# Patient Record
Sex: MALE | Race: WHITE | NOT HISPANIC OR LATINO | Employment: OTHER | URBAN - METROPOLITAN AREA
[De-identification: names, ages, dates, MRNs, and addresses within clinical notes are randomized per-mention and may not be internally consistent; named-entity substitution may affect disease eponyms.]

---

## 2019-01-08 ENCOUNTER — OFFICE VISIT (OUTPATIENT)
Dept: OTOLARYNGOLOGY | Facility: CLINIC | Age: 54
End: 2019-01-08
Payer: COMMERCIAL

## 2019-01-08 ENCOUNTER — CLINICAL SUPPORT (OUTPATIENT)
Dept: AUDIOLOGY | Facility: CLINIC | Age: 54
End: 2019-01-08
Payer: COMMERCIAL

## 2019-01-08 VITALS
HEART RATE: 82 BPM | DIASTOLIC BLOOD PRESSURE: 73 MMHG | WEIGHT: 185.19 LBS | TEMPERATURE: 99 F | HEIGHT: 70 IN | BODY MASS INDEX: 26.51 KG/M2 | SYSTOLIC BLOOD PRESSURE: 115 MMHG

## 2019-01-08 DIAGNOSIS — H90.A22 SENSORINEURAL HEARING LOSS (SNHL) OF LEFT EAR WITH RESTRICTED HEARING OF RIGHT EAR: ICD-10-CM

## 2019-01-08 DIAGNOSIS — H90.A11 CONDUCTIVE HEARING LOSS OF RIGHT EAR WITH RESTRICTED HEARING OF LEFT EAR: ICD-10-CM

## 2019-01-08 DIAGNOSIS — H71.93 CHOLESTEATOMA, BILATERAL: Primary | ICD-10-CM

## 2019-01-08 DIAGNOSIS — Z90.89 STATUS POST MASTOIDECTOMY: ICD-10-CM

## 2019-01-08 DIAGNOSIS — H90.0 CONDUCTIVE HEARING LOSS OF BOTH EARS: Primary | ICD-10-CM

## 2019-01-08 PROCEDURE — 92567 TYMPANOMETRY: CPT | Mod: S$GLB,,, | Performed by: AUDIOLOGIST

## 2019-01-08 PROCEDURE — 99204 PR OFFICE/OUTPT VISIT, NEW, LEVL IV, 45-59 MIN: ICD-10-PCS | Mod: 25,S$GLB,, | Performed by: OTOLARYNGOLOGY

## 2019-01-08 PROCEDURE — 69220 CLEAN OUT MASTOID CAVITY: CPT | Mod: LT,S$GLB,, | Performed by: OTOLARYNGOLOGY

## 2019-01-08 PROCEDURE — 99204 OFFICE O/P NEW MOD 45 MIN: CPT | Mod: 25,S$GLB,, | Performed by: OTOLARYNGOLOGY

## 2019-01-08 PROCEDURE — 3008F BODY MASS INDEX DOCD: CPT | Mod: CPTII,S$GLB,, | Performed by: OTOLARYNGOLOGY

## 2019-01-08 PROCEDURE — 99999 PR PBB SHADOW E&M-NEW PATIENT-LVL I: CPT | Mod: PBBFAC,,,

## 2019-01-08 PROCEDURE — 92557 COMPREHENSIVE HEARING TEST: CPT | Mod: S$GLB,,, | Performed by: AUDIOLOGIST

## 2019-01-08 PROCEDURE — 99999 PR PBB SHADOW E&M-NEW PATIENT-LVL I: ICD-10-PCS | Mod: PBBFAC,,,

## 2019-01-08 PROCEDURE — 99999 PR PBB SHADOW E&M-EST. PATIENT-LVL III: CPT | Mod: PBBFAC,,, | Performed by: OTOLARYNGOLOGY

## 2019-01-08 PROCEDURE — 92567 PR TYMPA2METRY: ICD-10-PCS | Mod: S$GLB,,, | Performed by: AUDIOLOGIST

## 2019-01-08 PROCEDURE — 92557 PR COMPREHENSIVE HEARING TEST: ICD-10-PCS | Mod: S$GLB,,, | Performed by: AUDIOLOGIST

## 2019-01-08 PROCEDURE — 3008F PR BODY MASS INDEX (BMI) DOCUMENTED: ICD-10-PCS | Mod: CPTII,S$GLB,, | Performed by: OTOLARYNGOLOGY

## 2019-01-08 PROCEDURE — 69220 PR DEBRIDE, MASTOID CAVITY, SIMPLE: ICD-10-PCS | Mod: LT,S$GLB,, | Performed by: OTOLARYNGOLOGY

## 2019-01-08 PROCEDURE — 99999 PR PBB SHADOW E&M-EST. PATIENT-LVL III: ICD-10-PCS | Mod: PBBFAC,,, | Performed by: OTOLARYNGOLOGY

## 2019-01-08 RX ORDER — TRETINOIN 0.1 MG/G
GEL TOPICAL
Refills: 3 | COMMUNITY
Start: 2018-12-21

## 2019-01-08 RX ORDER — ELVITEGRAVIR, COBICISTAT, EMTRICITABINE, AND TENOFOVIR ALAFENAMIDE 150; 150; 200; 10 MG/1; MG/1; MG/1; MG/1
1 TABLET ORAL DAILY
Refills: 1 | COMMUNITY
Start: 2018-12-10

## 2019-01-08 RX ORDER — LISINOPRIL AND HYDROCHLOROTHIAZIDE 10; 12.5 MG/1; MG/1
TABLET ORAL
Refills: 6 | COMMUNITY
Start: 2018-12-08

## 2019-01-08 NOTE — PROGRESS NOTES
Mr. Addison was seen today for a hearing evaluation. Mr. Addison stated he recently had surgery due to a cholesteatoma in his left ear.     Pure tone audiometry revealed mild-moderate CHL, AD and a mild to moderate primarily SNHL (conductive component at 4000 Hz.), AS  SRT and PTA are in good agreement bilaterally.    SRT was obtained at 40dB for the right ear with 100% speech discrimination and 30dB for the left ear with 100% speech discrimination.   Tympanometry revealed Type B, AD. Could not evaluate, AS    Recommendations:  1. Otologic Evaluation  2. Audiogram as needed  3. Hearing Protection  4. Amplification consult pending medical management

## 2019-01-08 NOTE — PROGRESS NOTES
Subjective:       Patient ID: Juan C Addison is a 53 y.o. male.    Chief Complaint: Hearing Loss    HPI     Juan C Addison is a 53 y.o. male with history of cholesteatoma and CSOM.  Had ear infections all his life, but has not had drainage for last few years.  Has had CWD surgery on left ear years ago in [place].  Has not had an ear cleaning in 4 yrs.  Currently denies otorrhea, otalgia.  Reports moderate, constant hearing loss of both ears.        Review of Systems   Constitutional: Negative for chills, fever and unexpected weight change.   HENT: Negative for sore throat and trouble swallowing.    Eyes: Negative for pain and visual disturbance.   Respiratory: Negative for apnea and shortness of breath.    Cardiovascular: Negative for chest pain and palpitations.   Gastrointestinal: Negative for abdominal pain and nausea.   Endocrine: Negative for cold intolerance and heat intolerance.   Musculoskeletal: Negative for joint swelling and neck stiffness.   Skin: Negative for color change and rash.   Neurological: Negative for facial asymmetry and headaches.   Hematological: Negative for adenopathy. Does not bruise/bleed easily.   Psychiatric/Behavioral: Negative for agitation. The patient is not nervous/anxious.         Objective:      Physical Exam   Constitutional: He is oriented to person, place, and time. He appears well-developed and well-nourished. No distress.   HENT:   Head: Normocephalic and atraumatic.   Right Ear: External ear and ear canal normal. Tympanic membrane is retracted (retraction pocket superiorly with ceruminous debris, IS joint erosioin noted).   Left Ear: Ear canal normal. There is drainage (canal wall down cavity filled with debris, there is some moisture noted under debris after removal  TM intact, with myringostapediopexy, epitympanic retraction pocket).   Nose: Nose normal.   Mouth/Throat: Uvula is midline, oropharynx is clear and moist and mucous membranes are normal.   Patiño:  Right  Rinne:  BC > AC AD. AC > BC AS   Eyes: Conjunctivae and EOM are normal. Pupils are equal, round, and reactive to light.   Neck: Normal range of motion. No tracheal deviation present. No thyromegaly present.   Cardiovascular: Normal rate and regular rhythm.   Pulmonary/Chest: Effort normal. No respiratory distress.   Musculoskeletal: Normal range of motion.   Lymphadenopathy:        Head (right side): No submental, no submandibular and no tonsillar adenopathy present.        Head (left side): No submental, no submandibular and no tonsillar adenopathy present.     He has no cervical adenopathy.   Neurological: He is alert and oriented to person, place, and time.   Psychiatric: He has a normal mood and affect. His behavior is normal.   Nursing note and vitals reviewed.      Procedure:  The patient was brought to the minor procedure room and the left cavity was cleaned of squamous and cerumenous debris with micro dissection techniques using the operating microscope. No evidence of recurrent or residual cholesteatoma. Patient tolerated procedure well.    Audio:          Assessment:       1. Cholesteatoma, bilateral    2. Status post mastoidectomy    3. Conductive hearing loss of right ear with restricted hearing of left ear    4. Sensorineural hearing loss (SNHL) of left ear with restricted hearing of right ear        53 yr old with bilateral chronic ear disease s/p left CWD mastoidectomy.  Has had dry ears for years. Right side with evidence of attic retraction suspicious for cholesteatoma.  Will evaluate with CT temporal bone, if negative can observe pocket for now.    Plan:    CT temporal bone   if negative ok to follow up in 4 month for cavity cleaning of left side

## 2019-01-15 ENCOUNTER — HOSPITAL ENCOUNTER (OUTPATIENT)
Dept: RADIOLOGY | Facility: HOSPITAL | Age: 54
Discharge: HOME | End: 2019-01-15
Attending: OTOLARYNGOLOGY
Payer: COMMERCIAL

## 2019-01-15 DIAGNOSIS — H71.93 CHOLESTEATOMA, BILATERAL: ICD-10-CM

## 2019-01-15 PROCEDURE — 70480 CT ORBIT/EAR/FOSSA W/O DYE: CPT | Mod: TC

## 2019-01-15 PROCEDURE — 70480 CT TEMPORAL BONE WITHOUT CONTRAST: ICD-10-PCS | Mod: 26,,, | Performed by: RADIOLOGY

## 2019-01-15 PROCEDURE — 70480 CT ORBIT/EAR/FOSSA W/O DYE: CPT | Mod: 26,,, | Performed by: RADIOLOGY

## 2019-01-16 ENCOUNTER — TELEPHONE (OUTPATIENT)
Dept: OTOLARYNGOLOGY | Facility: CLINIC | Age: 54
End: 2019-01-16

## 2019-01-16 NOTE — TELEPHONE ENCOUNTER
Spoke with patient regarding CT findings.  Based on clinical examination and opacification of attic and mastoid antrum are concerned about cholesteatoma of right ear and recommend tympanoplasty with mastoidectomy.  Patient expressed understanding and will plan to call to schedule surgery.

## 2019-01-18 ENCOUNTER — TELEPHONE (OUTPATIENT)
Dept: OTOLARYNGOLOGY | Facility: CLINIC | Age: 54
End: 2019-01-18

## 2019-01-18 DIAGNOSIS — H90.A22 SENSORINEURAL HEARING LOSS (SNHL) OF LEFT EAR WITH RESTRICTED HEARING OF RIGHT EAR: ICD-10-CM

## 2019-01-18 DIAGNOSIS — Z90.89 STATUS POST MASTOIDECTOMY: ICD-10-CM

## 2019-01-18 DIAGNOSIS — H71.93 CHOLESTEATOMA, BILATERAL: Primary | ICD-10-CM

## 2019-01-22 ENCOUNTER — TELEPHONE (OUTPATIENT)
Dept: OTOLARYNGOLOGY | Facility: CLINIC | Age: 54
End: 2019-01-22

## 2019-02-08 ENCOUNTER — TELEPHONE (OUTPATIENT)
Dept: OTOLARYNGOLOGY | Facility: CLINIC | Age: 54
End: 2019-02-08

## 2019-02-08 NOTE — PRE-PROCEDURE INSTRUCTIONS
PreOp Instructions given   - Verbal medication information (what to hold and what to take)   - NPO guidelines   - Arrival place directions given;  - Bathing with antibacterial soap   - Don't wear any jewelry or bring any valuables AM of surgery   - No makeup or moisturizer to face   - No perfume/cologne, powder, lotions or aftershave   Pt. verbalized understanding.  Denies any  history of side effects or issues with anesthesia or sedation.

## 2019-02-10 ENCOUNTER — ANESTHESIA EVENT (OUTPATIENT)
Dept: SURGERY | Facility: HOSPITAL | Age: 54
End: 2019-02-10
Payer: COMMERCIAL

## 2019-02-10 NOTE — ANESTHESIA PREPROCEDURE EVALUATION
[facility]  Anesthesia Pre-Operative Evaluation         Patient Name: Juan C Addison  YOB: 1965  MRN: [id number]    SUBJECTIVE:     Pre-operative evaluation for Procedure(s) (LRB):  TYMPANOPLASTY, WITH MASTOIDECTOMY (Right)     02/10/2019    Juan C Addison is a 53 y.o. male w/ a significant PMHx of HTN and HIV. Hx of chronic otitis media and cholesteatoma. CT findings again suggestive of cholesteatoma in R ear.    Patient now presents for the above procedure(s).      LDA: None documented.    Prev airway: None documented.    Drips: None documented.    There is no problem list on file for this patient.      Review of patient's allergies indicates:  No Known Allergies    Current Outpatient Medications:  No current facility-administered medications for this encounter.     Current Outpatient Medications:     GENVOYA [phone] mg Tab, Take 1 tablet by mouth once daily., Disp: , Rfl: 1    lisinopril-hydrochlorothiazide (PRINZIDE,ZESTORETIC) 10-12.5 mg per tablet, TAKE 1 TABLET BY MOUTH ONCE DAILY FOR 30 DAYS, Disp: , Rfl: 6    tretinoin (RETIN-A) 0.01 % gel, APPLY THIN LAYER TO FACE AT BEDTIME, Disp: , Rfl: 3    No past surgical history on file.    Social History     Socioeconomic History    Marital status:      Spouse name: Not on file    Number of children: Not on file    Years of education: Not on file    Highest education level: Not on file   Social Needs    Financial resource strain: Not on file    Food insecurity - worry: Not on file    Food insecurity - inability: Not on file    Transportation needs - medical: Not on file    Transportation needs - non-medical: Not on file   Occupational History    Not on file   Tobacco Use    Smoking status: Never Smoker    Smokeless tobacco: Never Used   Substance and Sexual Activity    Alcohol use: Not on file    Drug use: Not on file    Sexual activity: Not on file   Other Topics Concern    Not on file   Social  History Narrative    Not on file       OBJECTIVE:     Vital Signs Range (Last 24H):         Significant Labs:  No results found for: WBC, HGB, HCT, PLT, CHOL, TRIG, HDL, LDLDIRECT, ALT, AST, NA, K, CL, CREATININE, BUN, CO2, TSH, PSA, INR, GLUF, HGBA1C, MICROALBUR    Diagnostic Studies: No relevant studies.    EKG: No recent studies available.    2D ECHO:  No results found for this or any previous visit.      ASSESSMENT/PLAN:         Anesthesia Evaluation    I have reviewed the Patient Summary Reports.     I have reviewed the Medications.     Review of Systems  Anesthesia Hx:  No problems with previous Anesthesia  History of prior surgery of interest to airway management or planning:  Denies Personal Hx of Anesthesia complications.   Social:  Non-Smoker    Hematology/Oncology:        Hematology Comments: HIV+   Cardiovascular:   Hypertension Denies CAD.       Pulmonary:   Denies COPD.  Denies Asthma.  Denies Recent URI.    Renal/:   Denies Chronic Renal Disease.     Hepatic/GI:   Denies GERD.    Neurological:   Denies TIA. Denies CVA.    Endocrine:   Denies Diabetes.        Physical Exam  General:  Well nourished    Airway/Jaw/Neck:  Airway Findings: Mouth Opening: Normal Tongue: Normal  General Airway Assessment: Adult  Mallampati: II  TM Distance: Normal, at least 6 cm  Jaw/Neck Findings:  Neck ROM: Normal ROM      Dental:  Dental Findings: In tact   Chest/Lungs:  Chest/Lungs Findings: Clear to auscultation, Normal Respiratory Rate         Mental Status:  Mental Status Findings:  Cooperative, Alert and Oriented         Anesthesia Plan  Type of Anesthesia, risks & benefits discussed:  Anesthesia Type:  general  Patient's Preference:   Intra-op Monitoring Plan: standard ASA monitors  Intra-op Monitoring Plan Comments:   Post Op Pain Control Plan: multimodal analgesia, IV/PO Opioids PRN and per primary service following discharge from PACU  Post Op Pain Control Plan Comments:   Induction:   IV  Beta Blocker:   Patient is not currently on a Beta-Blocker (No further documentation required).       Informed Consent: Patient understands risks and agrees with Anesthesia plan.  Questions answered. Anesthesia consent signed with patient.  ASA Score: 2     Day of Surgery Review of History & Physical:    H&P update referred to the surgeon.         Ready For Surgery From Anesthesia Perspective.

## 2019-02-11 ENCOUNTER — ANESTHESIA (OUTPATIENT)
Dept: SURGERY | Facility: HOSPITAL | Age: 54
End: 2019-02-11
Payer: COMMERCIAL

## 2019-02-11 ENCOUNTER — HOSPITAL ENCOUNTER (OUTPATIENT)
Facility: HOSPITAL | Age: 54
Discharge: HOME | End: 2019-02-11
Attending: OTOLARYNGOLOGY | Admitting: OTOLARYNGOLOGY
Payer: COMMERCIAL

## 2019-02-11 VITALS
BODY MASS INDEX: 26.05 KG/M2 | HEIGHT: 70 IN | OXYGEN SATURATION: 95 % | WEIGHT: 182 LBS | RESPIRATION RATE: 16 BRPM | SYSTOLIC BLOOD PRESSURE: 114 MMHG | HEART RATE: 68 BPM | TEMPERATURE: 98 F | DIASTOLIC BLOOD PRESSURE: 63 MMHG

## 2019-02-11 DIAGNOSIS — H71.91 CHOLESTEATOMA OF RIGHT EAR: Primary | ICD-10-CM

## 2019-02-11 DIAGNOSIS — H71.91 CHOLESTEATOMA, RIGHT: ICD-10-CM

## 2019-02-11 PROCEDURE — 25000003 PHARM REV CODE 250: Performed by: NURSE ANESTHETIST, CERTIFIED REGISTERED

## 2019-02-11 PROCEDURE — 71000016 HC POSTOP RECOV ADDL HR: Performed by: OTOLARYNGOLOGY

## 2019-02-11 PROCEDURE — 25000003 PHARM REV CODE 250: Performed by: OTOLARYNGOLOGY

## 2019-02-11 PROCEDURE — 88305 TISSUE EXAM BY PATHOLOGIST: CPT | Mod: 26,,, | Performed by: PATHOLOGY

## 2019-02-11 PROCEDURE — 63600175 PHARM REV CODE 636 W HCPCS: Performed by: STUDENT IN AN ORGANIZED HEALTH CARE EDUCATION/TRAINING PROGRAM

## 2019-02-11 PROCEDURE — 88305 TISSUE EXAM BY PATHOLOGIST: CPT | Performed by: PATHOLOGY

## 2019-02-11 PROCEDURE — 94761 N-INVAS EAR/PLS OXIMETRY MLT: CPT

## 2019-02-11 PROCEDURE — 71000015 HC POSTOP RECOV 1ST HR: Performed by: OTOLARYNGOLOGY

## 2019-02-11 PROCEDURE — 36000709 HC OR TIME LEV III EA ADD 15 MIN: Performed by: OTOLARYNGOLOGY

## 2019-02-11 PROCEDURE — D9220A PRA ANESTHESIA: Mod: ,,, | Performed by: ANESTHESIOLOGY

## 2019-02-11 PROCEDURE — 37000009 HC ANESTHESIA EA ADD 15 MINS: Performed by: OTOLARYNGOLOGY

## 2019-02-11 PROCEDURE — 71000033 HC RECOVERY, INTIAL HOUR: Performed by: OTOLARYNGOLOGY

## 2019-02-11 PROCEDURE — 63600175 PHARM REV CODE 636 W HCPCS: Performed by: NURSE ANESTHETIST, CERTIFIED REGISTERED

## 2019-02-11 PROCEDURE — 27201423 OPTIME MED/SURG SUP & DEVICES STERILE SUPPLY: Performed by: OTOLARYNGOLOGY

## 2019-02-11 PROCEDURE — 63600175 PHARM REV CODE 636 W HCPCS: Performed by: ANESTHESIOLOGY

## 2019-02-11 PROCEDURE — 36000708 HC OR TIME LEV III 1ST 15 MIN: Performed by: OTOLARYNGOLOGY

## 2019-02-11 PROCEDURE — 88305 TISSUE SPECIMEN TO PATHOLOGY - SURGERY: ICD-10-PCS | Mod: 26,,, | Performed by: PATHOLOGY

## 2019-02-11 PROCEDURE — 69645 REVISE MIDDLE EAR & MASTOID: CPT | Mod: RT,,, | Performed by: OTOLARYNGOLOGY

## 2019-02-11 PROCEDURE — 69645 PR TYMPANOPLAS/MASTOIDEC,RADICAL/COMPLE: ICD-10-PCS | Mod: RT,,, | Performed by: OTOLARYNGOLOGY

## 2019-02-11 PROCEDURE — 25000003 PHARM REV CODE 250: Performed by: STUDENT IN AN ORGANIZED HEALTH CARE EDUCATION/TRAINING PROGRAM

## 2019-02-11 PROCEDURE — 27000221 HC OXYGEN, UP TO 24 HOURS

## 2019-02-11 PROCEDURE — 37000008 HC ANESTHESIA 1ST 15 MINUTES: Performed by: OTOLARYNGOLOGY

## 2019-02-11 PROCEDURE — D9220A PRA ANESTHESIA: ICD-10-PCS | Mod: ,,, | Performed by: ANESTHESIOLOGY

## 2019-02-11 RX ORDER — CEFAZOLIN SODIUM 1 G/3ML
2 INJECTION, POWDER, FOR SOLUTION INTRAMUSCULAR; INTRAVENOUS
Status: DISCONTINUED | OUTPATIENT
Start: 2019-02-11 | End: 2019-02-11 | Stop reason: HOSPADM

## 2019-02-11 RX ORDER — PROPOFOL 10 MG/ML
VIAL (ML) INTRAVENOUS
Status: DISCONTINUED | OUTPATIENT
Start: 2019-02-11 | End: 2019-02-11

## 2019-02-11 RX ORDER — KETAMINE HCL IN 0.9 % NACL 50 MG/5 ML
SYRINGE (ML) INTRAVENOUS
Status: DISCONTINUED | OUTPATIENT
Start: 2019-02-11 | End: 2019-02-11

## 2019-02-11 RX ORDER — HYDROMORPHONE HYDROCHLORIDE 1 MG/ML
0.2 INJECTION, SOLUTION INTRAMUSCULAR; INTRAVENOUS; SUBCUTANEOUS EVERY 5 MIN PRN
Status: DISCONTINUED | OUTPATIENT
Start: 2019-02-11 | End: 2019-02-11 | Stop reason: HOSPADM

## 2019-02-11 RX ORDER — DEXAMETHASONE SODIUM PHOSPHATE 4 MG/ML
INJECTION, SOLUTION INTRA-ARTICULAR; INTRALESIONAL; INTRAMUSCULAR; INTRAVENOUS; SOFT TISSUE
Status: DISCONTINUED | OUTPATIENT
Start: 2019-02-11 | End: 2019-02-11

## 2019-02-11 RX ORDER — CEPHALEXIN 500 MG/1
500 CAPSULE ORAL EVERY 12 HOURS
Qty: 14 CAPSULE | Refills: 0 | Status: SHIPPED | OUTPATIENT
Start: 2019-02-11 | End: 2019-02-18

## 2019-02-11 RX ORDER — ACETAMINOPHEN 10 MG/ML
INJECTION, SOLUTION INTRAVENOUS
Status: DISCONTINUED | OUTPATIENT
Start: 2019-02-11 | End: 2019-02-11

## 2019-02-11 RX ORDER — OXYCODONE AND ACETAMINOPHEN 5; 325 MG/1; MG/1
1 TABLET ORAL EVERY 4 HOURS PRN
Qty: 20 TABLET | Refills: 0 | Status: SHIPPED | OUTPATIENT
Start: 2019-02-11 | End: 2019-02-18

## 2019-02-11 RX ORDER — FENTANYL CITRATE 50 UG/ML
INJECTION, SOLUTION INTRAMUSCULAR; INTRAVENOUS
Status: DISCONTINUED | OUTPATIENT
Start: 2019-02-11 | End: 2019-02-11

## 2019-02-11 RX ORDER — NEOSTIGMINE METHYLSULFATE 1 MG/ML
INJECTION, SOLUTION INTRAVENOUS
Status: DISCONTINUED | OUTPATIENT
Start: 2019-02-11 | End: 2019-02-11

## 2019-02-11 RX ORDER — LIDOCAINE HYDROCHLORIDE 10 MG/ML
1 INJECTION, SOLUTION EPIDURAL; INFILTRATION; INTRACAUDAL; PERINEURAL ONCE
Status: DISCONTINUED | OUTPATIENT
Start: 2019-02-11 | End: 2019-02-11 | Stop reason: HOSPADM

## 2019-02-11 RX ORDER — GLYCOPYRROLATE 0.2 MG/ML
INJECTION INTRAMUSCULAR; INTRAVENOUS
Status: DISCONTINUED | OUTPATIENT
Start: 2019-02-11 | End: 2019-02-11

## 2019-02-11 RX ORDER — SODIUM CHLORIDE 9 MG/ML
INJECTION, SOLUTION INTRAVENOUS CONTINUOUS
Status: DISCONTINUED | OUTPATIENT
Start: 2019-02-11 | End: 2019-02-11 | Stop reason: HOSPADM

## 2019-02-11 RX ORDER — FENTANYL CITRATE 50 UG/ML
25 INJECTION, SOLUTION INTRAMUSCULAR; INTRAVENOUS EVERY 5 MIN PRN
Status: DISCONTINUED | OUTPATIENT
Start: 2019-02-11 | End: 2019-02-11 | Stop reason: HOSPADM

## 2019-02-11 RX ORDER — LIDOCAINE HYDROCHLORIDE AND EPINEPHRINE 10; 10 MG/ML; UG/ML
INJECTION, SOLUTION INFILTRATION; PERINEURAL
Status: DISCONTINUED | OUTPATIENT
Start: 2019-02-11 | End: 2019-02-11 | Stop reason: HOSPADM

## 2019-02-11 RX ORDER — ONDANSETRON 2 MG/ML
4 INJECTION INTRAMUSCULAR; INTRAVENOUS DAILY PRN
Status: DISCONTINUED | OUTPATIENT
Start: 2019-02-11 | End: 2019-02-11 | Stop reason: HOSPADM

## 2019-02-11 RX ORDER — ONDANSETRON 2 MG/ML
INJECTION INTRAMUSCULAR; INTRAVENOUS
Status: DISCONTINUED | OUTPATIENT
Start: 2019-02-11 | End: 2019-02-11

## 2019-02-11 RX ORDER — SODIUM CHLORIDE 0.9 % (FLUSH) 0.9 %
3 SYRINGE (ML) INJECTION
Status: DISCONTINUED | OUTPATIENT
Start: 2019-02-11 | End: 2019-02-11 | Stop reason: HOSPADM

## 2019-02-11 RX ORDER — ROCURONIUM BROMIDE 10 MG/ML
INJECTION, SOLUTION INTRAVENOUS
Status: DISCONTINUED | OUTPATIENT
Start: 2019-02-11 | End: 2019-02-11

## 2019-02-11 RX ORDER — LIDOCAINE HCL/PF 100 MG/5ML
SYRINGE (ML) INTRAVENOUS
Status: DISCONTINUED | OUTPATIENT
Start: 2019-02-11 | End: 2019-02-11

## 2019-02-11 RX ORDER — MIDAZOLAM HYDROCHLORIDE 1 MG/ML
INJECTION, SOLUTION INTRAMUSCULAR; INTRAVENOUS
Status: DISCONTINUED | OUTPATIENT
Start: 2019-02-11 | End: 2019-02-11

## 2019-02-11 RX ORDER — ONDANSETRON 8 MG/1
8 TABLET, ORALLY DISINTEGRATING ORAL EVERY 6 HOURS PRN
Qty: 20 TABLET | Refills: 0 | Status: SHIPPED | OUTPATIENT
Start: 2019-02-11 | End: 2019-02-18

## 2019-02-11 RX ADMIN — SODIUM CHLORIDE, SODIUM GLUCONATE, SODIUM ACETATE, POTASSIUM CHLORIDE, MAGNESIUM CHLORIDE, SODIUM PHOSPHATE, DIBASIC, AND POTASSIUM PHOSPHATE: .53; .5; .37; .037; .03; .012; .00082 INJECTION, SOLUTION INTRAVENOUS at 01:02

## 2019-02-11 RX ADMIN — MIDAZOLAM HYDROCHLORIDE 2 MG: 1 INJECTION, SOLUTION INTRAMUSCULAR; INTRAVENOUS at 12:02

## 2019-02-11 RX ADMIN — PROPOFOL 160 MG: 10 INJECTION, EMULSION INTRAVENOUS at 12:02

## 2019-02-11 RX ADMIN — Medication 20 MG: at 12:02

## 2019-02-11 RX ADMIN — HYDROMORPHONE HYDROCHLORIDE 0.2 MG: 1 INJECTION, SOLUTION INTRAMUSCULAR; INTRAVENOUS; SUBCUTANEOUS at 05:02

## 2019-02-11 RX ADMIN — FENTANYL CITRATE 50 MCG: 50 INJECTION, SOLUTION INTRAMUSCULAR; INTRAVENOUS at 05:02

## 2019-02-11 RX ADMIN — HYDROMORPHONE HYDROCHLORIDE 0.2 MG: 1 INJECTION, SOLUTION INTRAMUSCULAR; INTRAVENOUS; SUBCUTANEOUS at 06:02

## 2019-02-11 RX ADMIN — ROCURONIUM BROMIDE 20 MG: 10 INJECTION, SOLUTION INTRAVENOUS at 01:02

## 2019-02-11 RX ADMIN — DEXAMETHASONE SODIUM PHOSPHATE 4 MG: 4 INJECTION, SOLUTION INTRAMUSCULAR; INTRAVENOUS at 01:02

## 2019-02-11 RX ADMIN — SODIUM CHLORIDE 1000 ML: 0.9 INJECTION, SOLUTION INTRAVENOUS at 11:02

## 2019-02-11 RX ADMIN — ACETAMINOPHEN 1000 MG: 10 INJECTION, SOLUTION INTRAVENOUS at 01:02

## 2019-02-11 RX ADMIN — NEOSTIGMINE METHYLSULFATE 3 MG: 1 INJECTION INTRAVENOUS at 05:02

## 2019-02-11 RX ADMIN — FENTANYL CITRATE 50 MCG: 50 INJECTION, SOLUTION INTRAMUSCULAR; INTRAVENOUS at 01:02

## 2019-02-11 RX ADMIN — FENTANYL CITRATE 50 MCG: 50 INJECTION, SOLUTION INTRAMUSCULAR; INTRAVENOUS at 12:02

## 2019-02-11 RX ADMIN — FENTANYL CITRATE 50 MCG: 50 INJECTION, SOLUTION INTRAMUSCULAR; INTRAVENOUS at 04:02

## 2019-02-11 RX ADMIN — ROCURONIUM BROMIDE 50 MG: 10 INJECTION, SOLUTION INTRAVENOUS at 12:02

## 2019-02-11 RX ADMIN — ONDANSETRON 4 MG: 2 INJECTION INTRAMUSCULAR; INTRAVENOUS at 05:02

## 2019-02-11 RX ADMIN — Medication 20 MG: at 01:02

## 2019-02-11 RX ADMIN — ONDANSETRON 4 MG: 2 INJECTION INTRAMUSCULAR; INTRAVENOUS at 07:02

## 2019-02-11 RX ADMIN — LIDOCAINE HYDROCHLORIDE 100 MG: 20 INJECTION, SOLUTION INTRAVENOUS at 12:02

## 2019-02-11 RX ADMIN — CEFAZOLIN 2 G: 330 INJECTION, POWDER, FOR SOLUTION INTRAMUSCULAR; INTRAVENOUS at 01:02

## 2019-02-11 RX ADMIN — Medication 10 MG: at 02:02

## 2019-02-11 RX ADMIN — GLYCOPYRROLATE 0.3 MG: 0.2 INJECTION, SOLUTION INTRAMUSCULAR; INTRAVENOUS at 05:02

## 2019-02-11 NOTE — DISCHARGE SUMMARY
[facility]  Brief Operative Note     SUMMARY     Surgery Date: 2/11/2019     Surgeon(s) and Role:     * Slim Capellan MD - Primary     * Ki Radford MD - Resident - Assisting      Pre-op Diagnosis:  Cholesteatoma, bilateral [H71.93]  Status post mastoidectomy [Z90.89]  Sensorineural hearing loss (SNHL) of left ear with restricted hearing of right ear [H90.A22]    Post-op Diagnosis:  Post-Op Diagnosis Codes:     * Cholesteatoma, bilateral [H71.93]     * Status post mastoidectomy [Z90.89]     * Sensorineural hearing loss (SNHL) of left ear with restricted hearing of right ear [H90.A22]    Procedure(s) (LRB):  TYMPANOPLASTY, WITH MASTOIDECTOMY (Right)    Anesthesia: General    Description of the findings of the procedure: See op note for details.  Canal wall down mastoidectomy for cholesteatoma.    Estimated Blood Loss: * No values recorded between 2/11/2019  1:20 PM and 2/11/2019  5:33 PM *         Specimens:   Specimen (12h ago, onward)    Start     Ordered    02/11/19 1503  Specimen to Pathology - Surgery  Once     Comments:  1.Right mastoid- permanent     Start Status   02/11/19 1503 Collected (02/11/19 1505)       02/11/19 1504          Discharge Note    SUMMARY     Admit Date: 2/11/2019    Discharge Date and Time:  02/11/2019 5:44 PM    Hospital Course (synopsis of major diagnoses, care, treatment, and services provided during the course of the hospital stay): Tolerated surgery well without complication and stable for discharge post-operatively.     Final Diagnosis: Post-Op Diagnosis Codes:     * Cholesteatoma, bilateral [H71.93]     * Status post mastoidectomy [Z90.89]     * Sensorineural hearing loss (SNHL) of left ear with restricted hearing of right ear [H90.A22]    Disposition: Home or Self Care    Follow Up/Patient Instructions:     Medications:  Reconciled Home Medications:      Medication List      ASK your doctor about these medications    GENVOYA [phone] mg Tab  Generic  drug:  elviteg-cob-emtri-tenof ALAFEN  Take 1 tablet by mouth once daily.     lisinopril-hydrochlorothiazide 10-12.5 mg per tablet  Commonly known as:  PRINZIDE,ZESTORETIC  TAKE 1 TABLET BY MOUTH ONCE DAILY FOR 30 DAYS     tretinoin 0.01 % gel  Commonly known as:  RETIN-A  APPLY THIN LAYER TO FACE AT BEDTIME          No discharge procedures on file.

## 2019-02-11 NOTE — H&P
Subjective:       Patient ID: Juan C Addison is a 53 y.o. male.     Chief Complaint: Hearing Loss     HPI      Juan C Addison is a 53 y.o. male with history of cholesteatoma and CSOM.  Had ear infections all his life, but has not had drainage for last few years.  Has had CWD surgery on left ear years ago in [place].  Has not had an ear cleaning in 4 yrs.  Currently denies otorrhea, otalgia.  Reports moderate, constant hearing loss of both ears.          Review of Systems   Constitutional: Negative for chills, fever and unexpected weight change.   HENT: Negative for sore throat and trouble swallowing.    Eyes: Negative for pain and visual disturbance.   Respiratory: Negative for apnea and shortness of breath.    Cardiovascular: Negative for chest pain and palpitations.   Gastrointestinal: Negative for abdominal pain and nausea.   Endocrine: Negative for cold intolerance and heat intolerance.   Musculoskeletal: Negative for joint swelling and neck stiffness.   Skin: Negative for color change and rash.   Neurological: Negative for facial asymmetry and headaches.   Hematological: Negative for adenopathy. Does not bruise/bleed easily.   Psychiatric/Behavioral: Negative for agitation. The patient is not nervous/anxious.         Objective:   Physical Exam   Constitutional: He is oriented to person, place, and time. He appears well-developed and well-nourished. No distress.   HENT:   Head: Normocephalic and atraumatic.   Right Ear: External ear and ear canal normal. Tympanic membrane is retracted (retraction pocket superiorly with ceruminous debris, IS joint erosioin noted).   Left Ear: Ear canal normal. There is drainage (canal wall down cavity filled with debris, there is some moisture noted under debris after removal  TM intact, with myringostapediopexy, epitympanic retraction pocket).   Nose: Nose normal.   Mouth/Throat: Uvula is midline, oropharynx is clear and moist and mucous membranes are normal.   Patiño:  Right  Rinne:  BC > AC AD. AC > BC AS   Eyes: Conjunctivae and EOM are normal. Pupils are equal, round, and reactive to light.   Neck: Normal range of motion. No tracheal deviation present. No thyromegaly present.   Cardiovascular: Normal rate and regular rhythm.   Pulmonary/Chest: Effort normal. No respiratory distress.   Musculoskeletal: Normal range of motion.   Lymphadenopathy:        Head (right side): No submental, no submandibular and no tonsillar adenopathy present.        Head (left side): No submental, no submandibular and no tonsillar adenopathy present.     He has no cervical adenopathy.   Neurological: He is alert and oriented to person, place, and time.   Psychiatric: He has a normal mood and affect. His behavior is normal.   Nursing note and vitals reviewed.      Procedure:  The patient was brought to the minor procedure room and the left cavity was cleaned of squamous and cerumenous debris with micro dissection techniques using the operating microscope. No evidence of recurrent or residual cholesteatoma. Patient tolerated procedure well.     Audio:             Assessment:       1. Cholesteatoma, bilateral    2. Status post mastoidectomy    3. Conductive hearing loss of right ear with restricted hearing of left ear    4. Sensorineural hearing loss (SNHL) of left ear with restricted hearing of right ear        53 yr old with bilateral chronic ear disease s/p left CWD mastoidectomy.  Has had dry ears for years. Right side with evidence of attic retraction suspicious for cholesteatoma.  Will evaluate with CT temporal bone, if negative can observe pocket for now.    Plan:     To surgery for typanoplasty/mastoidectomy of the right ear.    H&P completed on 1/8/19 has been reviewed, the patient has been examined and:  I concur with the findings and no changes have occurred since H&P was written.    There are no hospital problems to display for this patient.

## 2019-02-11 NOTE — TRANSFER OF CARE
"Anesthesia Transfer of Care Note    Patient: Juan C Addison    Procedure(s) Performed: Procedure(s) (LRB):  TYMPANOPLASTY, WITH MASTOIDECTOMY (Right)    Patient location: PACU    Anesthesia Type: general    Transport from OR: Transported from OR on room air with adequate spontaneous ventilation    Post pain: adequate analgesia    Post assessment: no apparent anesthetic complications and tolerated procedure well    Post vital signs: stable    Level of consciousness: awake    Nausea/Vomiting: no nausea/vomiting    Complications: none    Transfer of care protocol was followed      Last vitals:   Visit Vitals  /81 (BP Location: Right arm, Patient Position: Lying)   Pulse 65   Temp 36.7 °C (98 °F) (Oral)   Resp 20   Ht 5' 10" (1.778 m)   Wt 82.6 kg (182 lb)   SpO2 99%   BMI 26.11 kg/m²     "

## 2019-02-12 ENCOUNTER — TELEPHONE (OUTPATIENT)
Dept: OTOLARYNGOLOGY | Facility: CLINIC | Age: 54
End: 2019-02-12

## 2019-02-12 NOTE — OP NOTE
[facility]  Surgery Department  Operative Note    SUMMARY     Date of Procedure: 2/11/2019     Procedure:   Tympanoplasty with modified radical mastoidectomy (canal wall down mastoidectomy)    Surgeon(s) and Role:     * Slim Capellan MD - Primary     * Ki Radford MD - Resident - Assisting        Pre-Operative Diagnosis: Cholesteatoma, bilateral [H71.93]  Status post mastoidectomy [Z90.89]  Sensorineural hearing loss (SNHL) of left ear with restricted hearing of right ear [H90.A22]    Post-Operative Diagnosis: Post-Op Diagnosis Codes:     * Cholesteatoma, bilateral [H71.93]     * Status post mastoidectomy [Z90.89]     * Sensorineural hearing loss (SNHL) of left ear with restricted hearing of right ear [H90.A22]    Anesthesia: General    Technical Procedures Used: CWD technique    Description of the Findings of the Procedure:  Epitympanic cholesteatoma with erosion of malleus head and incus body, extension to mastoid antrum, involvement of facial recess and posterior sinus    Significant Surgical Tasks Conducted by the Assistant(s), if Applicable: n/a    Complications: No    Estimated Blood Loss (EBL): 100ml     Procedure in detail:      The patient was taken to the operating room, placed under general anesthetic and intubated without difficulty. The NIM facial nerve monitoring electrodes were positioned and monitoring was performed throughout the procedure. There was no abnormal activity during this case. We inspected the ear canal, identified the defect of the parsflaccida, which was full of squamous debris.   We then infiltrated the canal and postauricular area with 1:100,000 of epinephrine.  Vascular strip incisions were made in the standard fashion for underlay tympanoplasty making our 12-6 incision a few millimeters from the defect.    We prepped and draped the ear in a sterile fashion. A postauricular incision was made just behind the postauricular crease and dissected down the mastoid  cortex.  A temporalis fascia graft was harvested using scissors.  I reflected the soft tissues anteriorly to the level of the ear canal and identified our prior incisions made in the ear canal. A tympanomeatal flap was raised and the middle ear was entered sharply.  Cholesteatoma was identified and it was located just in the epitympanum, and posterior to the stapes.  At the point we proceeded with mastoidectomy to identify the posterior limit of disease in the mastoid.      Once the borders of the disease in the middle ear had been identified we proceeded with mastoidectomy.  A #5 cutting bur was used to drill down the mastoid cortex.  We went all the way to the mastoid antrum. We finished a complete mastoidectomy with identification of the tegmen, sigmoid sinus.  The canal wall was left intact. F  Cholesteatoma was located in the mastoid involving the entire antrum and adherent to the lateral canal.  Epitympanectomy was performed and cholesteatoma was filling the posterior and anterior epitympanum.  Due to the extensive nature of the disease and limited access, it was decided to take the canal wall down.  The canal wall was removed using a cutting socorro, and the facial ridge was lowered to the level of the facial nerve using a 4 imra.        Cholesteatoma was then dissected from the middle ear, using microinstrumenation.  The ossicles were identified the IS joint was .  The incus and malleus head were remove due to involvement with cholesteatoma.  The disease was tracking into the posterior sinus, posterior to the stapes.  It was cleared from this area and from the epitympanum.  The remaining matrix was removed over the lateral semi-circular canal, there did not appear to be a fistula .  After complete removal of the cholesteatoma, the fascia graft was placed in an underlay fashion medial to the malleus and the remaining native TM.  The graft was layed onto the stapes captiulum.      Gelfoam was used to  secure the temporalis fascial graft and the entire cavity was filled with gelfoam soaked in ofloxacin solution.      A meatoplasty was performed next, by making 12 and 6 o' clock incisions in the ear canal extending laterally onto the conchal bowl.  The cartilage and soft tissue was thinned and the skin flap sutured posteriorly creating a much wider meatus.  The remainder of the ear canal skin was rotated into the cavity to facilitate epithelialization and the meatus was packed with gelfoam.    Newaygo dressing was applied. The patient was awakened from anesthesia and taken to the recovery room in stable condition. He will be given antibiotics and pain medicines and he will be given instructions to follow up with me in one week.              Implants: * No implants in log *    Specimens:   Specimen (12h ago, onward)    None                  Condition: Good    Disposition: PACU - hemodynamically stable.    Attestation: I was present and scrubbed for the entire procedure.

## 2019-02-12 NOTE — PROGRESS NOTES
Upon pt getting into wheelchair for discharge, latonia dressing appeared more saturated then upon arrival and pt movement out of bed. ENT on call paged. Drainage marking occurred. Awaiting return call.

## 2019-02-12 NOTE — ANESTHESIA POSTPROCEDURE EVALUATION
"Anesthesia Post Evaluation    Patient: Juan C Addison    Procedure(s) Performed: Procedure(s) (LRB):  TYMPANOPLASTY, WITH MASTOIDECTOMY (Right)    Final Anesthesia Type: general  Patient location during evaluation: PACU  Patient participation: Yes- Able to Participate  Level of consciousness: awake and alert and oriented  Post-procedure vital signs: reviewed and stable  Pain management: adequate  Airway patency: patent  PONV status at discharge: No PONV  Anesthetic complications: no      Cardiovascular status: blood pressure returned to baseline  Respiratory status: unassisted  Hydration status: euvolemic  Follow-up not needed.        Visit Vitals  /60   Pulse 74   Temp 36.5 °C (97.7 °F) (Temporal)   Resp 19   Ht 5' 10" (1.778 m)   Wt 82.6 kg (182 lb)   SpO2 96%   BMI 26.11 kg/m²       Pain/Chelsey Score: Pain Rating Prior to Med Admin: 3 (2/11/2019  6:00 PM)        "

## 2019-02-12 NOTE — PLAN OF CARE
Discharge instructions reviewed with pt and partner. Understanding verbalized. No complaints of pain reported. Paper prescriptions given. To be transported to car by PCT.

## 2019-02-12 NOTE — DISCHARGE INSTRUCTIONS
Tympanoplasty or Mastoidectomy Post-operative Instructions    Precautions  1.  Do not blow your nose until your physician has indicated that your ear is healed.  Any accumulated secretions in the nose may be drawn back into the throat and expectorated if desired.  This particularly important if you develop a cold.   2. Do not pop your ears by holding your nose and blowing air through the eustachian tube into the ear.  If it is necessary to sneeze, do so with your mouth open.  3. Do not allow water to enter the ear until advised by your physician that he ear is healed.  Until such time, when showering or washing your hair, cotton may be placed in the outer ear opening and covered in Vaseline.  If an incision was made in the skin behind your ear, water should be kept away from this area for 1 week.  4. Do not take an unnecessary chance of catching a cold.  Avoid undue exposure or fatigue.  Should you catch a cold, treat it in your usual way, reporting to your physician if you develop ear symptoms  5. You may anticipate a certain amount of pulsation, popping, clicking, and other sounds in the ear, and a feeling of fullness in the ear.  Occasional sharp shooting pains are not unusual.  Sometimes it may feel as if there is liquid in the ear.  6. Do not plan to drive a car home from the hospital.  Air travel is ok 2 days after surgery.  When changing altitude, you should remain awake and chew gum to stimulate swallowing.  Dizziness  Minor dizziness may be present on head motion and not concern you unless it increases  Hearing  Hearing may be worse temporarily after surgery due to swelling and packing in the ear canal.  An improvement may be noted after 6-8 weeks, while maximum improvement may take up to 4-6 months.  Discharge  A bloody or Watery discharge may occur during the healing period.  The outer ear cotton may be changed if necessary   A purulent discharge at any time is an indication to call to make an  appointment  Pain  Mild, intermittent ear pain is not unusual during the first 2 weeks.  Pain above or in front of the ear is common when chewing.  If you have persistent unrelenting pain please let your physician know  Ear Drops  If you were given ear drops please start 1 week after surgery.  Place a few drops in the ear twice a day to loosen the packing which will run out of the ear as a liquid.  Tip the head to the side, place two drops in the ear, and allow them to remain for 5 minutes.  The tip the head to the opposite direction to allow the drops to run out.  Continue using until advised otherwise by your physician.                PATIENT INSTRUCTIONS  POST-ANESTHESIA    IMMEDIATELY FOLLOWING SURGERY:  Do not drive or operate machinery for the first twenty four hours after surgery.  Do not make any important decisions for twenty four hours after surgery or while taking narcotic pain medications or sedatives.  If you develop intractable nausea and vomiting or a severe headache please notify your doctor immediately.    FOLLOW-UP:  Please make an appointment with your surgeon as instructed. You do not need to follow up with anesthesia unless specifically instructed to do so.    WOUND CARE INSTRUCTIONS (if applicable):  Keep a dry clean dressing on the anesthesia/puncture wound site if there is drainage.  Once the wound has quit draining you may leave it open to air.  Generally you should leave the bandage intact for twenty four hours unless there is drainage.  If the epidural site drains for more than 36-48 hours please call the anesthesia department.    QUESTIONS?:  Please feel free to call your physician or the hospital  if you have any questions, and they will be happy to assist you.         Recovery After Procedural Sedation (Adult)  You have been given medicine by vein to make you sleep during your surgery. This may have included both a pain medicine and sleeping medicine. Most of the effects have  worn off. But you may still have some drowsiness for the next 6 to 8 hours.  Home care  Follow these guidelines when you get home:  · For the next 8 hours, you should be watched by a responsible adult. This person should make sure your condition is not getting worse.  · Don't drink any alcohol for the next 24 hours.  · Don't drive, operate dangerous machinery, or make important business or personal decisions during the next 24 hours.  Note: Your healthcare provider may tell you not to take any medicine by mouth for pain or sleep in the next 4 hours. These medicines may react with the medicines you were given in the hospital. This could cause a much stronger response than usual.  Follow-up care  Follow up with your healthcare provider if you are not alert and back to your usual level of activity within 12 hours.  When to seek medical advice  Call your healthcare provider right away if any of these occur:  · Drowsiness gets worse  · Weakness or dizziness gets worse  · Repeated vomiting  · You can't be awakened   Date Last Reviewed: 10/18/2016  © 0949-3479 [employer]. [address] [place] [address], [place] [address]. All rights reserved. This information is not intended as a substitute for professional medical care. Always follow your healthcare professional's instructions.

## 2019-02-12 NOTE — TELEPHONE ENCOUNTER
----- Message from Diane Smith sent at 2/12/2019  3:22 PM CST -----  Contact: pt  Pt would like to be called back regarding ear bleeding  Pt can be reached at [phone]

## 2019-02-12 NOTE — PROGRESS NOTES
MD Russo to bedside to address concerns of bleeding. Readjustment to latonia occurred, requested that pt be sent home with gauze incase of drainage. Pt re educated on calling clinic and reporting to ER if bleeding persists. Understanding verbalized.

## 2019-02-19 ENCOUNTER — OFFICE VISIT (OUTPATIENT)
Dept: OTOLARYNGOLOGY | Facility: CLINIC | Age: 54
End: 2019-02-19
Payer: COMMERCIAL

## 2019-02-19 VITALS — BODY MASS INDEX: 26.7 KG/M2 | HEIGHT: 70 IN | WEIGHT: 186.5 LBS

## 2019-02-19 DIAGNOSIS — H71.93 CHOLESTEATOMA, BILATERAL: Primary | ICD-10-CM

## 2019-02-19 PROCEDURE — 99024 POSTOP FOLLOW-UP VISIT: CPT | Mod: S$GLB,,, | Performed by: OTOLARYNGOLOGY

## 2019-02-19 PROCEDURE — 99999 PR PBB SHADOW E&M-EST. PATIENT-LVL II: ICD-10-PCS | Mod: PBBFAC,,, | Performed by: OTOLARYNGOLOGY

## 2019-02-19 PROCEDURE — 99999 PR PBB SHADOW E&M-EST. PATIENT-LVL II: CPT | Mod: PBBFAC,,, | Performed by: OTOLARYNGOLOGY

## 2019-02-19 PROCEDURE — 99024 PR POST-OP FOLLOW-UP VISIT: ICD-10-PCS | Mod: S$GLB,,, | Performed by: OTOLARYNGOLOGY

## 2019-02-19 RX ORDER — CIPROFLOXACIN AND DEXAMETHASONE 3; 1 MG/ML; MG/ML
4 SUSPENSION/ DROPS AURICULAR (OTIC) 2 TIMES DAILY
Qty: 7.5 ML | Refills: 3 | Status: SHIPPED | OUTPATIENT
Start: 2019-02-19 | End: 2019-03-01

## 2019-02-19 NOTE — PROGRESS NOTES
Steri strips removed. No evidence of hematoma or seroma. No evidence of infection. Packing is dry and intact. Start ototopical drops and re check in three weeks.

## 2019-03-26 ENCOUNTER — OFFICE VISIT (OUTPATIENT)
Dept: OTOLARYNGOLOGY | Facility: CLINIC | Age: 54
End: 2019-03-26
Payer: COMMERCIAL

## 2019-03-26 ENCOUNTER — TELEPHONE (OUTPATIENT)
Dept: OTOLARYNGOLOGY | Facility: CLINIC | Age: 54
End: 2019-03-26

## 2019-03-26 VITALS — HEIGHT: 70 IN | BODY MASS INDEX: 26.7 KG/M2 | WEIGHT: 186.5 LBS

## 2019-03-26 DIAGNOSIS — H71.93 CHOLESTEATOMA, BILATERAL: Primary | ICD-10-CM

## 2019-03-26 PROCEDURE — 99999 PR PBB SHADOW E&M-EST. PATIENT-LVL II: CPT | Mod: PBBFAC,,, | Performed by: OTOLARYNGOLOGY

## 2019-03-26 PROCEDURE — 99999 PR PBB SHADOW E&M-EST. PATIENT-LVL II: ICD-10-PCS | Mod: PBBFAC,,, | Performed by: OTOLARYNGOLOGY

## 2019-03-26 PROCEDURE — 99024 POSTOP FOLLOW-UP VISIT: CPT | Mod: S$GLB,,, | Performed by: OTOLARYNGOLOGY

## 2019-03-26 PROCEDURE — 99024 PR POST-OP FOLLOW-UP VISIT: ICD-10-PCS | Mod: S$GLB,,, | Performed by: OTOLARYNGOLOGY

## 2019-03-26 RX ORDER — OFLOXACIN 3 MG/ML
4 SOLUTION/ DROPS OPHTHALMIC 2 TIMES DAILY
Qty: 10 ML | Refills: 3 | Status: SHIPPED | OUTPATIENT
Start: 2019-03-26

## 2019-03-26 NOTE — PROGRESS NOTES
Post auricular incision healing well. S/p AD CWD mastoidectomy    Packing vacuumed out of ear with microscope.    Graft intact and healing well.  Cavity healing, no signs of infection.     Patient tolerated well.    RTC 3 mo for audiogram and final clean out.    Continue ear drops as directed.     No masses; no nipple discharge

## 2019-03-26 NOTE — TELEPHONE ENCOUNTER
----- Message from Lakisha Mosquera sent at 3/26/2019  4:39 PM CDT -----  Contact: Aniya with [facility]  Pharmacy Calling    Reason for call:  Ofloxacin (OCUFLOX) 0.3 % ophthalmic was sent in as eye drops would like to verify that this is correct     Pharmacy Name: [facility]    Phone Number: [phone]    Additional Information:

## 2019-06-26 ENCOUNTER — OFFICE VISIT (OUTPATIENT)
Dept: OTOLARYNGOLOGY | Facility: CLINIC | Age: 54
End: 2019-06-26
Payer: COMMERCIAL

## 2019-06-26 ENCOUNTER — CLINICAL SUPPORT (OUTPATIENT)
Dept: AUDIOLOGY | Facility: CLINIC | Age: 54
End: 2019-06-26
Payer: COMMERCIAL

## 2019-06-26 ENCOUNTER — TELEPHONE (OUTPATIENT)
Dept: OTOLARYNGOLOGY | Facility: CLINIC | Age: 54
End: 2019-06-26

## 2019-06-26 VITALS — WEIGHT: 186.5 LBS | BODY MASS INDEX: 26.7 KG/M2 | HEIGHT: 70 IN

## 2019-06-26 DIAGNOSIS — Z90.89 STATUS POST MASTOIDECTOMY: ICD-10-CM

## 2019-06-26 DIAGNOSIS — H71.93 CHOLESTEATOMA, BILATERAL: ICD-10-CM

## 2019-06-26 DIAGNOSIS — H90.A31 MIXED CONDUCTIVE AND SENSORINEURAL HEARING LOSS OF RIGHT EAR WITH RESTRICTED HEARING OF LEFT EAR: Primary | ICD-10-CM

## 2019-06-26 DIAGNOSIS — H91.90 HEARING LOSS, UNSPECIFIED HEARING LOSS TYPE, UNSPECIFIED LATERALITY: Primary | ICD-10-CM

## 2019-06-26 PROCEDURE — 99999 PR PBB SHADOW E&M-EST. PATIENT-LVL II: CPT | Mod: PBBFAC,,, | Performed by: OTOLARYNGOLOGY

## 2019-06-26 PROCEDURE — 69220 PR DEBRIDE, MASTOID CAVITY, SIMPLE: ICD-10-PCS | Mod: 50,S$GLB,, | Performed by: OTOLARYNGOLOGY

## 2019-06-26 PROCEDURE — 69220 CLEAN OUT MASTOID CAVITY: CPT | Mod: 50,S$GLB,, | Performed by: OTOLARYNGOLOGY

## 2019-06-26 PROCEDURE — 92557 PR COMPREHENSIVE HEARING TEST: ICD-10-PCS | Mod: S$GLB,,, | Performed by: AUDIOLOGIST

## 2019-06-26 PROCEDURE — 99999 PR PBB SHADOW E&M-EST. PATIENT-LVL I: ICD-10-PCS | Mod: PBBFAC,,,

## 2019-06-26 PROCEDURE — 3008F PR BODY MASS INDEX (BMI) DOCUMENTED: ICD-10-PCS | Mod: CPTII,S$GLB,, | Performed by: OTOLARYNGOLOGY

## 2019-06-26 PROCEDURE — 99213 OFFICE O/P EST LOW 20 MIN: CPT | Mod: 25,S$GLB,, | Performed by: OTOLARYNGOLOGY

## 2019-06-26 PROCEDURE — 3008F BODY MASS INDEX DOCD: CPT | Mod: CPTII,S$GLB,, | Performed by: OTOLARYNGOLOGY

## 2019-06-26 PROCEDURE — 92557 COMPREHENSIVE HEARING TEST: CPT | Mod: S$GLB,,, | Performed by: AUDIOLOGIST

## 2019-06-26 PROCEDURE — 99999 PR PBB SHADOW E&M-EST. PATIENT-LVL II: ICD-10-PCS | Mod: PBBFAC,,, | Performed by: OTOLARYNGOLOGY

## 2019-06-26 PROCEDURE — 99213 PR OFFICE/OUTPT VISIT, EST, LEVL III, 20-29 MIN: ICD-10-PCS | Mod: 25,S$GLB,, | Performed by: OTOLARYNGOLOGY

## 2019-06-26 PROCEDURE — 99999 PR PBB SHADOW E&M-EST. PATIENT-LVL I: CPT | Mod: PBBFAC,,,

## 2019-06-26 NOTE — PROGRESS NOTES
Subjective:       Patient ID: Juan C Addison is a 53 y.o. male.    Chief Complaint: s/p bilatera ear surgery    HPI     Juan C Addison is a 53 y.o. male with history of AU CWD mastoidectomies most recently of AD on 2/11/2019.  He reports he has been doing well, denies any drainage or otalgia.  He does report poor hearing of that ear however.    Review of Systems   Constitutional: Negative for chills and fever.   HENT: Negative for sore throat and trouble swallowing.    Respiratory: Negative for apnea and chest tightness.    Cardiovascular: Negative for chest pain.       Objective:      Physical Exam   Constitutional: He appears well-developed and well-nourished.   HENT:   Head: Normocephalic and atraumatic.   Nose: Nose normal.   Mouth/Throat: Uvula is midline, oropharynx is clear and moist and mucous membranes are normal.   Patient was brought to the minor procedure room and first the right then the left ear cavity was cleaned with microdissection techniques. There was no evidence of residual or recurrent cholesteatoma. Patient tolerated the procedure well. Cavities are dry and TM is intact bilaterally.     Neck: Normal range of motion. No thyroid mass present.       Data:            Assessment:       1. Asymmetrical right sensorineural hearing loss    2. Status post mastoidectomy    3. Cholesteatoma, bilateral        Plan:    patient has experienced SNHL AD following surgery   the cavity has healed well otherwise   recommend BAHA evaluation

## 2019-07-02 ENCOUNTER — CLINICAL SUPPORT (OUTPATIENT)
Dept: AUDIOLOGY | Facility: CLINIC | Age: 54
End: 2019-07-02

## 2019-07-02 DIAGNOSIS — H90.A31 MIXED CONDUCTIVE AND SENSORINEURAL HEARING LOSS OF RIGHT EAR WITH RESTRICTED HEARING OF LEFT EAR: Primary | ICD-10-CM

## 2019-07-02 PROCEDURE — 99499 UNLISTED E&M SERVICE: CPT | Mod: S$GLB,,, | Performed by: OTOLARYNGOLOGY

## 2019-07-02 PROCEDURE — 99499 NO LOS: ICD-10-PCS | Mod: S$GLB,,, | Performed by: OTOLARYNGOLOGY

## 2019-07-02 NOTE — PROGRESS NOTES
Juan C Addison was seen in the clinic today for a Baha consult.    Mr. Addison has a history of cholesteatoma and right mixed hearing loss with poor speech recognition. He was referred by Dr. Capellan for a Baha consult. The processor was programmed for the right side. BC direct was measured. Mr. Addison was pleased with how the processor sounded. Testing was performed in soundfield and based on the results Mr. Addison would be an excellent candidate. He was interested in the Baha 5 Connect system with a brown processor and a TV device. He would like to purchase a Mini-microphone 2+ separately.

## 2019-07-03 ENCOUNTER — TELEPHONE (OUTPATIENT)
Dept: OTOLARYNGOLOGY | Facility: CLINIC | Age: 54
End: 2019-07-03

## 2019-07-03 DIAGNOSIS — H71.93 CHOLESTEATOMA, BILATERAL: ICD-10-CM

## 2019-07-03 DIAGNOSIS — Z90.89 STATUS POST ADENOIDECTOMY: ICD-10-CM

## 2019-07-03 NOTE — TELEPHONE ENCOUNTER
----- Message from Fern King sent at 7/3/2019 10:00 AM CDT -----  Contact: pt at [phone]  Needs Advice    Reason for call: Master nurse-Pt saw a female  Doctor this past Tuesday for cochlear implants. Was told that Master nurse will schedule the implants.          Communication Preference:call/Thanks    Additional Information:

## 2019-08-09 NOTE — PRE-PROCEDURE INSTRUCTIONS
PREOP INSTRUCTIONS:No solid food ,milk or milk products for 8 hours prior to procedure.Clear liquids are allowed up to 2 hours before arrival time.Clear liquids are:water,apple juice,pedialyte,gatorade,& jello.Shower instructions as well as directions to the 2nd floor [facility] were given.Patient encouraged to wear loose fitting,comfortable clothing that preferably buttons up the front.Medication instructions for pm prior to and am of procedure reviewed.Patient stated an understanding.    Patient denies any side effects or issues with anesthesia or sedation.

## 2019-08-12 ENCOUNTER — ANESTHESIA (OUTPATIENT)
Dept: SURGERY | Facility: HOSPITAL | Age: 54
End: 2019-08-12
Payer: COMMERCIAL

## 2019-08-12 ENCOUNTER — ANESTHESIA EVENT (OUTPATIENT)
Dept: SURGERY | Facility: HOSPITAL | Age: 54
End: 2019-08-12
Payer: COMMERCIAL

## 2019-08-12 ENCOUNTER — HOSPITAL ENCOUNTER (OUTPATIENT)
Facility: HOSPITAL | Age: 54
Discharge: HOME | End: 2019-08-12
Attending: OTOLARYNGOLOGY | Admitting: OTOLARYNGOLOGY
Payer: COMMERCIAL

## 2019-08-12 VITALS
RESPIRATION RATE: 18 BRPM | SYSTOLIC BLOOD PRESSURE: 134 MMHG | OXYGEN SATURATION: 96 % | BODY MASS INDEX: 27.06 KG/M2 | HEIGHT: 70 IN | HEART RATE: 63 BPM | TEMPERATURE: 98 F | WEIGHT: 189 LBS | DIASTOLIC BLOOD PRESSURE: 58 MMHG

## 2019-08-12 PROCEDURE — 25000003 PHARM REV CODE 250: Performed by: NURSE ANESTHETIST, CERTIFIED REGISTERED

## 2019-08-12 PROCEDURE — 63600175 PHARM REV CODE 636 W HCPCS: Performed by: NURSE ANESTHETIST, CERTIFIED REGISTERED

## 2019-08-12 PROCEDURE — 63600175 PHARM REV CODE 636 W HCPCS: Mod: JG | Performed by: OTOLARYNGOLOGY

## 2019-08-12 PROCEDURE — 36000711: Performed by: OTOLARYNGOLOGY

## 2019-08-12 PROCEDURE — 25000003 PHARM REV CODE 250: Performed by: OTOLARYNGOLOGY

## 2019-08-12 PROCEDURE — 63600175 PHARM REV CODE 636 W HCPCS: Performed by: OTOLARYNGOLOGY

## 2019-08-12 PROCEDURE — 71000033 HC RECOVERY, INTIAL HOUR: Performed by: OTOLARYNGOLOGY

## 2019-08-12 PROCEDURE — 69714 PR IMPLANTATION, OSSEOINT IMPL, SKULL: ICD-10-PCS | Mod: RT,,, | Performed by: OTOLARYNGOLOGY

## 2019-08-12 PROCEDURE — 69714 IMPL OI IMPLT SKULL PERQ ESP: CPT | Mod: RT,,, | Performed by: OTOLARYNGOLOGY

## 2019-08-12 PROCEDURE — 71000015 HC POSTOP RECOV 1ST HR: Performed by: OTOLARYNGOLOGY

## 2019-08-12 PROCEDURE — 37000008 HC ANESTHESIA 1ST 15 MINUTES: Performed by: OTOLARYNGOLOGY

## 2019-08-12 PROCEDURE — D9220A PRA ANESTHESIA: Mod: ,,, | Performed by: ANESTHESIOLOGY

## 2019-08-12 PROCEDURE — 94761 N-INVAS EAR/PLS OXIMETRY MLT: CPT

## 2019-08-12 PROCEDURE — L8690 AUD OSSEO DEV, INT/EXT COMP: HCPCS | Performed by: OTOLARYNGOLOGY

## 2019-08-12 PROCEDURE — 36000710: Performed by: OTOLARYNGOLOGY

## 2019-08-12 PROCEDURE — D9220A PRA ANESTHESIA: ICD-10-PCS | Mod: ,,, | Performed by: ANESTHESIOLOGY

## 2019-08-12 PROCEDURE — 37000009 HC ANESTHESIA EA ADD 15 MINS: Performed by: OTOLARYNGOLOGY

## 2019-08-12 PROCEDURE — 27201423 OPTIME MED/SURG SUP & DEVICES STERILE SUPPLY: Performed by: OTOLARYNGOLOGY

## 2019-08-12 DEVICE — IMPLANTABLE DEVICE: Type: IMPLANTABLE DEVICE | Site: EAR | Status: FUNCTIONAL

## 2019-08-12 RX ORDER — CEPHALEXIN 500 MG/1
500 CAPSULE ORAL EVERY 6 HOURS
Qty: 40 CAPSULE | Refills: 0 | Status: SHIPPED | OUTPATIENT
Start: 2019-08-12 | End: 2019-08-12 | Stop reason: SDUPTHER

## 2019-08-12 RX ORDER — ROCURONIUM BROMIDE 10 MG/ML
INJECTION, SOLUTION INTRAVENOUS
Status: DISCONTINUED | OUTPATIENT
Start: 2019-08-12 | End: 2019-08-12

## 2019-08-12 RX ORDER — ONDANSETRON 2 MG/ML
INJECTION INTRAMUSCULAR; INTRAVENOUS
Status: DISCONTINUED | OUTPATIENT
Start: 2019-08-12 | End: 2019-08-12

## 2019-08-12 RX ORDER — METHYLENE BLUE 10 MG/ML
INJECTION INTRAVENOUS
Status: DISCONTINUED | OUTPATIENT
Start: 2019-08-12 | End: 2019-08-12 | Stop reason: HOSPADM

## 2019-08-12 RX ORDER — LIDOCAINE HCL/PF 100 MG/5ML
SYRINGE (ML) INTRAVENOUS
Status: DISCONTINUED | OUTPATIENT
Start: 2019-08-12 | End: 2019-08-12

## 2019-08-12 RX ORDER — KETAMINE HCL IN 0.9 % NACL 50 MG/5 ML
SYRINGE (ML) INTRAVENOUS
Status: DISCONTINUED | OUTPATIENT
Start: 2019-08-12 | End: 2019-08-12

## 2019-08-12 RX ORDER — LIDOCAINE HYDROCHLORIDE 10 MG/ML
1 INJECTION, SOLUTION EPIDURAL; INFILTRATION; INTRACAUDAL; PERINEURAL ONCE
Status: DISCONTINUED | OUTPATIENT
Start: 2019-08-12 | End: 2019-08-12 | Stop reason: HOSPADM

## 2019-08-12 RX ORDER — FENTANYL CITRATE 50 UG/ML
25 INJECTION, SOLUTION INTRAMUSCULAR; INTRAVENOUS EVERY 5 MIN PRN
Status: DISCONTINUED | OUTPATIENT
Start: 2019-08-12 | End: 2019-08-12 | Stop reason: HOSPADM

## 2019-08-12 RX ORDER — HYDROCODONE BITARTRATE AND ACETAMINOPHEN 5; 325 MG/1; MG/1
1 TABLET ORAL EVERY 6 HOURS PRN
Status: DISCONTINUED | OUTPATIENT
Start: 2019-08-12 | End: 2019-08-12 | Stop reason: HOSPADM

## 2019-08-12 RX ORDER — LIDOCAINE HYDROCHLORIDE 40 MG/ML
SOLUTION TOPICAL
Status: DISCONTINUED | OUTPATIENT
Start: 2019-08-12 | End: 2019-08-12

## 2019-08-12 RX ORDER — LIDOCAINE HYDROCHLORIDE AND EPINEPHRINE 10; 10 MG/ML; UG/ML
INJECTION, SOLUTION INFILTRATION; PERINEURAL
Status: DISCONTINUED | OUTPATIENT
Start: 2019-08-12 | End: 2019-08-12 | Stop reason: HOSPADM

## 2019-08-12 RX ORDER — SODIUM CHLORIDE 0.9 % (FLUSH) 0.9 %
10 SYRINGE (ML) INJECTION
Status: DISCONTINUED | OUTPATIENT
Start: 2019-08-12 | End: 2019-08-12 | Stop reason: HOSPADM

## 2019-08-12 RX ORDER — GLYCOPYRROLATE 0.2 MG/ML
INJECTION INTRAMUSCULAR; INTRAVENOUS
Status: DISCONTINUED | OUTPATIENT
Start: 2019-08-12 | End: 2019-08-12

## 2019-08-12 RX ORDER — HYDROCODONE BITARTRATE AND ACETAMINOPHEN 5; 325 MG/1; MG/1
1 TABLET ORAL EVERY 6 HOURS PRN
Qty: 20 TABLET | Refills: 0 | Status: ON HOLD | OUTPATIENT
Start: 2019-08-12 | End: 2022-06-29 | Stop reason: SDUPTHER

## 2019-08-12 RX ORDER — CEFAZOLIN SODIUM 1 G/3ML
2 INJECTION, POWDER, FOR SOLUTION INTRAMUSCULAR; INTRAVENOUS
Status: COMPLETED | OUTPATIENT
Start: 2019-08-12 | End: 2019-08-12

## 2019-08-12 RX ORDER — MIDAZOLAM HYDROCHLORIDE 1 MG/ML
INJECTION, SOLUTION INTRAMUSCULAR; INTRAVENOUS
Status: DISCONTINUED | OUTPATIENT
Start: 2019-08-12 | End: 2019-08-12

## 2019-08-12 RX ORDER — HYDROCODONE BITARTRATE AND ACETAMINOPHEN 5; 325 MG/1; MG/1
1 TABLET ORAL EVERY 6 HOURS PRN
Qty: 20 TABLET | Refills: 0 | Status: SHIPPED | OUTPATIENT
Start: 2019-08-12 | End: 2019-08-12 | Stop reason: SDUPTHER

## 2019-08-12 RX ORDER — SODIUM CHLORIDE 9 MG/ML
INJECTION, SOLUTION INTRAVENOUS CONTINUOUS PRN
Status: DISCONTINUED | OUTPATIENT
Start: 2019-08-12 | End: 2019-08-12

## 2019-08-12 RX ORDER — DEXAMETHASONE SODIUM PHOSPHATE 4 MG/ML
INJECTION, SOLUTION INTRA-ARTICULAR; INTRALESIONAL; INTRAMUSCULAR; INTRAVENOUS; SOFT TISSUE
Status: DISCONTINUED | OUTPATIENT
Start: 2019-08-12 | End: 2019-08-12

## 2019-08-12 RX ORDER — PROPOFOL 10 MG/ML
VIAL (ML) INTRAVENOUS
Status: DISCONTINUED | OUTPATIENT
Start: 2019-08-12 | End: 2019-08-12

## 2019-08-12 RX ORDER — NEOSTIGMINE METHYLSULFATE 1 MG/ML
INJECTION, SOLUTION INTRAVENOUS
Status: DISCONTINUED | OUTPATIENT
Start: 2019-08-12 | End: 2019-08-12

## 2019-08-12 RX ORDER — FENTANYL CITRATE 50 UG/ML
INJECTION, SOLUTION INTRAMUSCULAR; INTRAVENOUS
Status: DISCONTINUED | OUTPATIENT
Start: 2019-08-12 | End: 2019-08-12

## 2019-08-12 RX ORDER — PHENYLEPHRINE HYDROCHLORIDE 10 MG/ML
INJECTION INTRAVENOUS
Status: DISCONTINUED | OUTPATIENT
Start: 2019-08-12 | End: 2019-08-12

## 2019-08-12 RX ORDER — CEPHALEXIN 500 MG/1
500 CAPSULE ORAL EVERY 6 HOURS
Qty: 40 CAPSULE | Refills: 0 | Status: SHIPPED | OUTPATIENT
Start: 2019-08-12 | End: 2019-08-22

## 2019-08-12 RX ADMIN — LIDOCAINE HYDROCHLORIDE 100 MG: 20 INJECTION, SOLUTION INTRAVENOUS at 07:08

## 2019-08-12 RX ADMIN — SODIUM CHLORIDE: 0.9 INJECTION, SOLUTION INTRAVENOUS at 06:08

## 2019-08-12 RX ADMIN — SODIUM CHLORIDE, SODIUM GLUCONATE, SODIUM ACETATE, POTASSIUM CHLORIDE, MAGNESIUM CHLORIDE, SODIUM PHOSPHATE, DIBASIC, AND POTASSIUM PHOSPHATE: .53; .5; .37; .037; .03; .012; .00082 INJECTION, SOLUTION INTRAVENOUS at 07:08

## 2019-08-12 RX ADMIN — CEFAZOLIN 2 G: 330 INJECTION, POWDER, FOR SOLUTION INTRAMUSCULAR; INTRAVENOUS at 07:08

## 2019-08-12 RX ADMIN — FENTANYL CITRATE 50 MCG: 50 INJECTION, SOLUTION INTRAMUSCULAR; INTRAVENOUS at 08:08

## 2019-08-12 RX ADMIN — PHENYLEPHRINE HYDROCHLORIDE 50 MCG: 10 INJECTION INTRAVENOUS at 07:08

## 2019-08-12 RX ADMIN — Medication 30 MG: at 07:08

## 2019-08-12 RX ADMIN — HYDROCODONE BITARTRATE AND ACETAMINOPHEN 1 TABLET: 5; 325 TABLET ORAL at 08:08

## 2019-08-12 RX ADMIN — ONDANSETRON 4 MG: 2 INJECTION INTRAMUSCULAR; INTRAVENOUS at 08:08

## 2019-08-12 RX ADMIN — LIDOCAINE HYDROCHLORIDE 4 ML: 40 SOLUTION TOPICAL at 07:08

## 2019-08-12 RX ADMIN — Medication 20 MG: at 07:08

## 2019-08-12 RX ADMIN — DEXAMETHASONE SODIUM PHOSPHATE 8 MG: 4 INJECTION, SOLUTION INTRAMUSCULAR; INTRAVENOUS at 07:08

## 2019-08-12 RX ADMIN — FENTANYL CITRATE 50 MCG: 50 INJECTION, SOLUTION INTRAMUSCULAR; INTRAVENOUS at 07:08

## 2019-08-12 RX ADMIN — NEOSTIGMINE METHYLSULFATE 4 MG: 1 INJECTION INTRAVENOUS at 08:08

## 2019-08-12 RX ADMIN — PHENYLEPHRINE HYDROCHLORIDE 50 MCG: 10 INJECTION INTRAVENOUS at 08:08

## 2019-08-12 RX ADMIN — MIDAZOLAM HYDROCHLORIDE 2 MG: 1 INJECTION, SOLUTION INTRAMUSCULAR; INTRAVENOUS at 06:08

## 2019-08-12 RX ADMIN — GLYCOPYRROLATE 0.4 MG: 0.2 INJECTION, SOLUTION INTRAMUSCULAR; INTRAVENOUS at 08:08

## 2019-08-12 RX ADMIN — FENTANYL CITRATE 100 MCG: 50 INJECTION, SOLUTION INTRAMUSCULAR; INTRAVENOUS at 07:08

## 2019-08-12 RX ADMIN — ROCURONIUM BROMIDE 50 MG: 10 INJECTION, SOLUTION INTRAVENOUS at 07:08

## 2019-08-12 RX ADMIN — PROPOFOL 170 MG: 10 INJECTION, EMULSION INTRAVENOUS at 07:08

## 2019-08-12 NOTE — BRIEF OP NOTE
[facility]  Brief Operative Note     SUMMARY     Surgery Date: 8/12/2019     Surgeon(s) and Role:     * Slim Capellan MD - Primary     * Angelica Roe MD - Resident - Assisting        Pre-op Diagnosis:  Asymmetrical right sensorineural hearing loss [H90.41]  Status post adenoidectomy [Z90.89]  Cholesteatoma, bilateral [H71.93]    Post-op Diagnosis:  Post-Op Diagnosis Codes:     * Asymmetrical right sensorineural hearing loss [H90.41]     * Status post adenoidectomy [Z90.89]     * Cholesteatoma, bilateral [H71.93]    Procedure(s) (LRB):  INSERTION, BAHA (Right)    Anesthesia: General    Description of the findings of the procedure: right baha    Findings/Key Components: see full op note for details    Estimated Blood Loss: * No values recorded between 8/12/2019  7:25 AM and 8/12/2019  8:18 AM *         Specimens:   Specimen (12h ago, onward)    None          Discharge Note    SUMMARY     Admit Date: 8/12/2019    Discharge Date and Time:  08/12/2019 8:18 AM    Hospital Course (synopsis of major diagnoses, care, treatment, and services provided during the course of the hospital stay): Following completion of an electively scheduled procedure, the patient was transferred to the floor for postoperative monitoring.His  hospital course was uneventful and noted for adequate pain control and PO intake following surgery. He   is discharged home in good condition and will follow-up with Dr. Capellan          Final Diagnosis: Post-Op Diagnosis Codes:     * Asymmetrical right sensorineural hearing loss [H90.41]     * Status post adenoidectomy [Z90.89]     * Cholesteatoma, bilateral [H71.93]    Disposition: Home or Self Care    Follow Up/Patient Instructions:     Medications:  Reconciled Home Medications:      Medication List      START taking these medications    cephALEXin 500 MG capsule  Commonly known as:  KEFLEX  Take 1 capsule (500 mg total) by mouth every 6 (six) hours. for 10 days      HYDROcodone-acetaminophen 5-325 mg per tablet  Commonly known as:  NORCO  Take 1 tablet by mouth every 6 (six) hours as needed.        CONTINUE taking these medications    GENVOYA [phone] mg Tab  Generic drug:  elviteg-cob-emtri-tenof ALAFEN  Take 1 tablet by mouth once daily.     lisinopril-hydrochlorothiazide 10-12.5 mg per tablet  Commonly known as:  PRINZIDE,ZESTORETIC  TAKE 1 TABLET BY MOUTH ONCE DAILY FOR 30 DAYS     ofloxacin 0.3 % ophthalmic solution  Commonly known as:  OCUFLOX  Place 4 drops into the right eye 2 (two) times daily. Apply to EAR not eye     tretinoin 0.01 % gel  Commonly known as:  RETIN-A  APPLY THIN LAYER TO FACE AT BEDTIME          Discharge Procedure Orders   Diet Adult Regular     Other restrictions (specify):   Order Comments: No heavy lifting or straining x 2 weeks. Ok to wash shower over the sink with a towel - do not wet incision. Healing cap to stay on until clinic visit. Take off latonia tomorrow     Notify your health care provider if you experience any of the following:  temperature >100.4     Notify your health care provider if you experience any of the following:  redness, tenderness, or signs of infection (pain, swelling, redness, odor or green/yellow discharge around incision site)     Notify your health care provider if you experience any of the following:  severe uncontrolled pain     Remove dressing in 24 hours     Follow-up Information     Slim Capellan MD In 1 week.    Specialty:  Otolaryngology  Why:  For wound re-check  Contact information:  [address] [facility] [address]  [phone]

## 2019-08-12 NOTE — H&P
ENT H&P    Subjective:       Patient ID: Juan C Addison is a 53 y.o. male.     Chief Complaint: s/p bilatera ear surgery     HPI      Juan C Addison is a 53 y.o. male with history of AU CWD mastoidectomies most recently of AD on 2/11/2019.  He reports he has been doing well, denies any drainage or otalgia.  He does report poor hearing of that ear however.     Review of Systems   Constitutional: Negative for chills and fever.   HENT: Negative for sore throat and trouble swallowing.    Respiratory: Negative for apnea and chest tightness.    Cardiovascular: Negative for chest pain.       Objective:   Physical Exam   Constitutional: He appears well-developed and well-nourished.   HENT:   Head: Normocephalic and atraumatic.   Nose: Nose normal.   Mouth/Throat: Uvula is midline, oropharynx is clear and moist and mucous membranes are normal.   Patient was brought to the minor procedure room and first the right then the left ear cavity was cleaned with microdissection techniques. There was no evidence of residual or recurrent cholesteatoma. Patient tolerated the procedure well. Cavities are dry and TM is intact bilaterally.     Neck: Normal range of motion. No thyroid mass present.       Data:               Assessment:       1. Asymmetrical right sensorineural hearing loss    2. Status post mastoidectomy    3. Cholesteatoma, bilateral        Plan:     To OR today for R erana

## 2019-08-12 NOTE — PLAN OF CARE
Discharge instructions given, patient verbalized understanding. Consents in chart, Vitals stable, no complaints.

## 2019-08-12 NOTE — ANESTHESIA POSTPROCEDURE EVALUATION
Anesthesia Post Evaluation    Patient: Juan C Addison    Procedure(s) Performed: Procedure(s) (LRB):  INSERTION, BAHA (Right)    Final Anesthesia Type: general  Patient location during evaluation: PACU  Patient participation: Yes- Able to Participate  Level of consciousness: awake and alert and oriented  Post-procedure vital signs: reviewed and stable  Pain management: adequate  Airway patency: patent  PONV status at discharge: No PONV  Anesthetic complications: no      Cardiovascular status: blood pressure returned to baseline, hemodynamically stable and stable  Respiratory status: unassisted, room air and spontaneous ventilation  Hydration status: euvolemic  Follow-up not needed.          Vitals Value Taken Time   /58 8/12/2019  9:32 AM   Temp 36.3 °C (97.3 °F) 8/12/2019  9:00 AM   Pulse 68 8/12/2019  9:52 AM   Resp 18 8/12/2019  9:07 AM   SpO2 95 % 8/12/2019  9:52 AM   Vitals shown include unvalidated device data.      Event Time     Out of Recovery 09:03:00          Pain/Chelsey Score: Pain Rating Prior to Med Admin: 4 (8/12/2019  8:42 AM)  Pain Rating Post Med Admin: 0 (8/12/2019  9:03 AM)  Chelsey Score: 10 (8/12/2019  9:07 AM)

## 2019-08-12 NOTE — OP NOTE
[facility]  Surgery Department  Operative Note    SUMMARY     Date of Procedure: 8/12/2019     Procedure: Procedure(s) (LRB):  INSERTION, BAHA (Right)     Surgeon(s) and Role:     * Slim Capellan MD - Primary     * Angelica Roe MD - Resident - Assisting        Pre-Operative Diagnosis: Asymmetrical right sensorineural hearing loss [H90.41]  Status post adenoidectomy [Z90.89]  Cholesteatoma, bilateral [H71.93]    Post-Operative Diagnosis: Post-Op Diagnosis Codes:     * Asymmetrical right sensorineural hearing loss [H90.41]     * Status post adenoidectomy [Z90.89]     * Cholesteatoma, bilateral [H71.93]    Anesthesia: General    Technical Procedures Used: FAST technique    Description of the Findings of the Procedure:   1.  8mm abutment placed with 4mm implant      Complications: No    Estimated Blood Loss (EBL): 5ml    Procedure in detail:    Patient was brought to the operating room and intubated by the anesthesia team.  The bed was turned 180 degrees the selected side was prepped by shaving the post auricular area.  A ruler was used to measure 55mm from the meatus of the external auditory canal at a 45 degree angle.  This spot was marked and a 3 incision was marked 1cm anterior to the marked location.  Using a needle and a hemostat the depth of the skin was measured and a 8mm abutment was chosen.  The incision was injected with 1 % lidocaine with epinephrine and the patient was prepped and draped.    The incision was made with a 15 blade down to the periosteum. A self retaining retractor was placed and the periosteum was incised in the planned device location in a cruciate fashion.  The periosteum was elevated to expose the cortex.      Drilling commenced at 20,000 rpm using the 4mm drill with drill guard in place.  After drilling to 3mm the hole was checked for dural exposure.  Having confirmed no dural exposure, the guard was removed and the hole was drilled to 4mm.  The 4mm  counter-sink drill was then used to widen the hole and create a counter sink.  The drill settings were then changed to 40 rpm and the implant and abutment were attached to the drill. wihout using irrigation for the first 280 degrees of insertion the implant was placed perpendicular to the skull using the drill, until it locked into place.    The skin flap was then brought over the implant and a punch biopsy was performed directly over the abutment.  The abutment was brought through the skin.  The incision was then closed in layers.  The healing cap was applied to the abutment and a glass-cock dressing was placed to protect the device.      The patient was  Turned over to the anesthesia team and awakened from the surgery.               Implants:   Implant Name Type Inv. Item Serial No.  Lot No. LRB No. Used   IMPLANT BAHA 4MM 8MM ABUTMENT - [id number]  IMPLANT BAHA 4MM 8MM ABUTMENT  [facility] [id number] Right 1       Specimens:   Specimen (12h ago, onward)    None                  Condition: Good    Disposition: PACU - hemodynamically stable.    Attestation: I was present and scrubbed for the entire procedure.

## 2019-08-12 NOTE — DISCHARGE INSTRUCTIONS

## 2019-08-12 NOTE — ANESTHESIA PREPROCEDURE EVALUATION
08/12/2019  Pre-operative evaluation for Procedure(s) (LRB):  INSERTION, BAHA (Right)    Juan C Addison is a 53 y.o. male hx of HTN    Patient Active Problem List   Diagnosis    Cholesteatoma, right       Review of patient's allergies indicates:  No Known Allergies    No current facility-administered medications on file prior to encounter.      Current Outpatient Medications on File Prior to Encounter   Medication Sig Dispense Refill    GENVOYA [id number] mg Tab Take 1 tablet by mouth once daily.  1    lisinopril-hydrochlorothiazide (PRINZIDE,ZESTORETIC) 10-12.5 mg per tablet TAKE 1 TABLET BY MOUTH ONCE DAILY FOR 30 DAYS  6    tretinoin (RETIN-A) 0.01 % gel APPLY THIN LAYER TO FACE AT BEDTIME  3    ofloxacin (OCUFLOX) 0.3 % ophthalmic solution Place 4 drops into the right eye 2 (two) times daily. Apply to EAR not eye 10 mL 3       Past Surgical History:   Procedure Laterality Date    HERNIA REPAIR      HERNIA REPAIR      TONSILLECTOMY      TONSILLECTOMY      TYMPANOPLASTY      TYMPANOPLASTY, WITH MASTOIDECTOMY Right 2/11/2019    Performed by Slim Capellan MD at [facility] OR [address] [place] [address]       Social History     Socioeconomic History    Marital status: Single     Spouse name: Not on file    Number of children: Not on file    Years of education: Not on file    Highest education level: Not on file   Occupational History    Not on file   Social Needs    Financial resource strain: Not on file    Food insecurity:     Worry: Not on file     Inability: Not on file    Transportation needs:     Medical: Not on file     Non-medical: Not on file   Tobacco Use    Smoking status: Never Smoker    Smokeless tobacco: Never Used   Substance and Sexual Activity    Alcohol use: Yes     Frequency: Never     Comment: 1-2 glasses daily    Drug use: No    Sexual activity: Not on file   Lifestyle    Physical  activity:     Days per week: Not on file     Minutes per session: Not on file    Stress: Not on file   Relationships    Social connections:     Talks on phone: Not on file     Gets together: Not on file     Attends [employer] service: Not on file     Active member of club or organization: Not on file     Attends meetings of clubs or organizations: Not on file     Relationship status: Not on file   Other Topics Concern    Not on file   Social History Narrative    Not on file         CBC: No results for input(s): WBC, RBC, HGB, HCT, PLT, MCV, MCH, MCHC in the last 72 hours.    CMP: No results for input(s): NA, K, CL, CO2, BUN, CREATININE, GLU, MG, PHOS, CALCIUM, ALBUMIN, PROT, ALKPHOS, ALT, AST, BILITOT in the last 72 hours.    INR  No results for input(s): PT, INR, PROTIME, APTT in the last 72 hours.        Diagnostic Studies:      EKD Echo:  No results found for this or any previous visit.      Anesthesia Evaluation    I have reviewed the Patient Summary Reports.     I have reviewed the Nursing Notes.   I have reviewed the Medications.     Review of Systems  Anesthesia Hx:  No problems with previous Anesthesia  History of prior surgery of interest to airway management or planning: Denies Family Hx of Anesthesia complications.   Denies Personal Hx of Anesthesia complications.   Hematology/Oncology:         -- Denies Anemia:   Cardiovascular:   Exercise tolerance: good Hypertension Denies CAD.    Denies CABG/stent.     Pulmonary:  Pulmonary Normal    Renal/:  Renal/ Normal     Hepatic/GI:  Hepatic/GI Normal    Neurological:  Neurology Normal    Endocrine:  Endocrine Normal        Physical Exam  General:  Well nourished    Airway/Jaw/Neck:  Airway Findings: Mouth Opening: Normal Tongue: Normal  General Airway Assessment: Adult  Mallampati: II  Improves to I with phonation.  TM Distance: Normal, at least 6 cm  Jaw/Neck Findings:  Neck ROM: Normal ROM      Dental:  Dental Findings: In tact     Chest/Lungs:  Chest/Lungs Findings: Clear to auscultation, Normal Respiratory Rate     Heart/Vascular:  Heart Findings: Rate: Normal  Rhythm: Regular Rhythm  Sounds: Normal        Mental Status:  Mental Status Findings:  Cooperative, Alert and Oriented         Anesthesia Plan  Type of Anesthesia, risks & benefits discussed:  Anesthesia Type:  general  Patient's Preference:   Intra-op Monitoring Plan: standard ASA monitors  Intra-op Monitoring Plan Comments:   Post Op Pain Control Plan: per primary service following discharge from PACU  Post Op Pain Control Plan Comments:   Induction:   IV  Beta Blocker:  Patient is not currently on a Beta-Blocker (No further documentation required).       Informed Consent: Patient understands risks and agrees with Anesthesia plan.  Questions answered. Anesthesia consent signed with patient.  ASA Score: 2     Day of Surgery Review of History & Physical:            Ready For Surgery From Anesthesia Perspective.

## 2019-08-20 ENCOUNTER — OFFICE VISIT (OUTPATIENT)
Dept: OTOLARYNGOLOGY | Facility: CLINIC | Age: 54
End: 2019-08-20
Payer: COMMERCIAL

## 2019-08-20 VITALS — WEIGHT: 185.63 LBS | HEIGHT: 70 IN | BODY MASS INDEX: 26.57 KG/M2

## 2019-08-20 PROCEDURE — 99024 POSTOP FOLLOW-UP VISIT: CPT | Mod: S$GLB,,, | Performed by: OTOLARYNGOLOGY

## 2019-08-20 PROCEDURE — 99999 PR PBB SHADOW E&M-EST. PATIENT-LVL II: ICD-10-PCS | Mod: PBBFAC,,, | Performed by: OTOLARYNGOLOGY

## 2019-08-20 PROCEDURE — 99999 PR PBB SHADOW E&M-EST. PATIENT-LVL II: CPT | Mod: PBBFAC,,, | Performed by: OTOLARYNGOLOGY

## 2019-08-20 PROCEDURE — 99024 PR POST-OP FOLLOW-UP VISIT: ICD-10-PCS | Mod: S$GLB,,, | Performed by: OTOLARYNGOLOGY

## 2019-08-28 ENCOUNTER — OFFICE VISIT (OUTPATIENT)
Dept: OTOLARYNGOLOGY | Facility: CLINIC | Age: 54
End: 2019-08-28
Payer: COMMERCIAL

## 2019-08-28 VITALS
DIASTOLIC BLOOD PRESSURE: 73 MMHG | BODY MASS INDEX: 26.57 KG/M2 | WEIGHT: 185.19 LBS | HEART RATE: 78 BPM | SYSTOLIC BLOOD PRESSURE: 112 MMHG

## 2019-08-28 PROCEDURE — 99024 PR POST-OP FOLLOW-UP VISIT: ICD-10-PCS | Mod: S$GLB,,, | Performed by: OTOLARYNGOLOGY

## 2019-08-28 PROCEDURE — 99999 PR PBB SHADOW E&M-EST. PATIENT-LVL II: CPT | Mod: PBBFAC,,, | Performed by: OTOLARYNGOLOGY

## 2019-08-28 PROCEDURE — 99999 PR PBB SHADOW E&M-EST. PATIENT-LVL II: ICD-10-PCS | Mod: PBBFAC,,, | Performed by: OTOLARYNGOLOGY

## 2019-08-28 PROCEDURE — 99024 POSTOP FOLLOW-UP VISIT: CPT | Mod: S$GLB,,, | Performed by: OTOLARYNGOLOGY

## 2019-10-03 ENCOUNTER — TELEPHONE (OUTPATIENT)
Dept: OTOLARYNGOLOGY | Facility: CLINIC | Age: 54
End: 2019-10-03

## 2019-10-03 NOTE — TELEPHONE ENCOUNTER
----- Message from Gina Mullen sent at 10/3/2019  8:04 AM CDT -----  Contact: pt  Reason: Calling to speak with staff pertaining to procedure he had a few months ago he need some information    Communication:[phone]

## 2019-10-21 NOTE — PROGRESS NOTES
Juan C Addison was seen in the clinic today to  his right Baha processor.     The processor was programmed. BC direct was measured. The feedback manager was performed. Mr. Addison was pleased with how the processor sounded. He was given 3 programs: Everyday, Noise, and Outdoor. The alerts were demonstrated. Mr. Addison was shown how to properly connect/remove the processor to the abutment. Care and maintenance was also reviewed. The processor was paired to the accessories as well as the iPhone. He was shown how to adjust the processor using the phone. He was also shown how to navigate the kaden. All appropriate paperwork was signed and will be mailed off to [email or web address].     Mr. Addison is in the process to moving to [place]. He will return to the clinic in 3-4 months when he follows up with Dr. Capellan. Mr. Addison was encouraged to call the clinic if he needed to be seen sooner.    Bone Anchored Hearing Aid Information:     : Cochlear  Model: Baha 5  Type: Connect  Side: Right  Color: Brown  Battery: 312  Processor Serial Number: [id number]  Warranty: 10/22/2021    Accessory: TV Streamer  Accessory Serial Number: [id number]  Accessory Warranty: 10/22/2020    Accessory: Mini-Microphone 2+  Accessory Serial Number: [id number]  Accessory Warranty: 10/22/2020

## 2019-10-22 ENCOUNTER — CLINICAL SUPPORT (OUTPATIENT)
Dept: AUDIOLOGY | Facility: CLINIC | Age: 54
End: 2019-10-22
Payer: COMMERCIAL

## 2019-10-22 ENCOUNTER — OFFICE VISIT (OUTPATIENT)
Dept: OTOLARYNGOLOGY | Facility: CLINIC | Age: 54
End: 2019-10-22
Payer: COMMERCIAL

## 2019-10-22 VITALS — WEIGHT: 185.19 LBS | BODY MASS INDEX: 26.51 KG/M2 | HEIGHT: 70 IN

## 2019-10-22 DIAGNOSIS — H71.91 CHOLESTEATOMA, RIGHT: ICD-10-CM

## 2019-10-22 DIAGNOSIS — H90.A31 MIXED CONDUCTIVE AND SENSORINEURAL HEARING LOSS OF RIGHT EAR WITH RESTRICTED HEARING OF LEFT EAR: Primary | ICD-10-CM

## 2019-10-22 PROCEDURE — 99024 PR POST-OP FOLLOW-UP VISIT: ICD-10-PCS | Mod: S$GLB,,, | Performed by: OTOLARYNGOLOGY

## 2019-10-22 PROCEDURE — 69220 PR DEBRIDE, MASTOID CAVITY, SIMPLE: ICD-10-PCS | Mod: 50,58,S$GLB, | Performed by: OTOLARYNGOLOGY

## 2019-10-22 PROCEDURE — 69220 CLEAN OUT MASTOID CAVITY: CPT | Mod: 50,58,S$GLB, | Performed by: OTOLARYNGOLOGY

## 2019-10-22 PROCEDURE — 99999 PR PBB SHADOW E&M-EST. PATIENT-LVL II: CPT | Mod: PBBFAC,,, | Performed by: OTOLARYNGOLOGY

## 2019-10-22 PROCEDURE — 99999 PR PBB SHADOW E&M-EST. PATIENT-LVL II: ICD-10-PCS | Mod: PBBFAC,,, | Performed by: OTOLARYNGOLOGY

## 2019-10-22 PROCEDURE — 99499 UNLISTED E&M SERVICE: CPT | Mod: S$GLB,,, | Performed by: AUDIOLOGIST-HEARING AID FITTER

## 2019-10-22 PROCEDURE — 99499 NO LOS: ICD-10-PCS | Mod: S$GLB,,, | Performed by: AUDIOLOGIST-HEARING AID FITTER

## 2019-10-22 PROCEDURE — 99024 POSTOP FOLLOW-UP VISIT: CPT | Mod: S$GLB,,, | Performed by: OTOLARYNGOLOGY

## 2019-10-22 NOTE — PROGRESS NOTES
2mo s/p AD BAHA    Area healed well. No signs of infection. Ok for activation      Patient was brought to the minor procedure room and first the right then the left ear cavity was cleaned with microdissection techniques. There was no evidence of residual or recurrent cholesteatoma. Patient tolerated the procedure well.      Follow up 4mo for cavity cleaning

## 2021-10-01 PROBLEM — H91.8X1 OTHER SPECIFIED HEARING LOSS, RIGHT EAR: Status: ACTIVE | Noted: 2019-08-12

## 2022-05-10 ENCOUNTER — TELEPHONE (OUTPATIENT)
Dept: OTOLARYNGOLOGY | Facility: CLINIC | Age: 57
End: 2022-05-10
Payer: COMMERCIAL

## 2022-05-10 NOTE — TELEPHONE ENCOUNTER
Spoke with pt to schedule a sooner appointment. Appointment scheduled, pt happy with date and time.

## 2022-05-27 ENCOUNTER — OFFICE VISIT (OUTPATIENT)
Dept: OTOLARYNGOLOGY | Facility: CLINIC | Age: 57
End: 2022-05-27
Payer: COMMERCIAL

## 2022-05-27 VITALS — BODY MASS INDEX: 26.54 KG/M2 | WEIGHT: 185 LBS

## 2022-05-27 DIAGNOSIS — Z96.21 STATUS POST PLACEMENT OF BONE ANCHORED HEARING AID (BAHA): Primary | ICD-10-CM

## 2022-05-27 PROCEDURE — 99213 OFFICE O/P EST LOW 20 MIN: CPT | Mod: 25,S$GLB,, | Performed by: OTOLARYNGOLOGY

## 2022-05-27 PROCEDURE — 4010F PR ACE/ARB THEARPY RXD/TAKEN: ICD-10-PCS | Mod: CPTII,S$GLB,, | Performed by: OTOLARYNGOLOGY

## 2022-05-27 PROCEDURE — 69220 CLEAN OUT MASTOID CAVITY: CPT | Mod: 50,S$GLB,, | Performed by: OTOLARYNGOLOGY

## 2022-05-27 PROCEDURE — 99999 PR PBB SHADOW E&M-EST. PATIENT-LVL II: ICD-10-PCS | Mod: PBBFAC,,, | Performed by: OTOLARYNGOLOGY

## 2022-05-27 PROCEDURE — 4010F ACE/ARB THERAPY RXD/TAKEN: CPT | Mod: CPTII,S$GLB,, | Performed by: OTOLARYNGOLOGY

## 2022-05-27 PROCEDURE — 1159F MED LIST DOCD IN RCRD: CPT | Mod: CPTII,S$GLB,, | Performed by: OTOLARYNGOLOGY

## 2022-05-27 PROCEDURE — 69220 PR DEBRIDE, MASTOID CAVITY, SIMPLE: ICD-10-PCS | Mod: 50,S$GLB,, | Performed by: OTOLARYNGOLOGY

## 2022-05-27 PROCEDURE — 99999 PR PBB SHADOW E&M-EST. PATIENT-LVL II: CPT | Mod: PBBFAC,,, | Performed by: OTOLARYNGOLOGY

## 2022-05-27 PROCEDURE — 3008F BODY MASS INDEX DOCD: CPT | Mod: CPTII,S$GLB,, | Performed by: OTOLARYNGOLOGY

## 2022-05-27 PROCEDURE — 3008F PR BODY MASS INDEX (BMI) DOCUMENTED: ICD-10-PCS | Mod: CPTII,S$GLB,, | Performed by: OTOLARYNGOLOGY

## 2022-05-27 PROCEDURE — 99213 PR OFFICE/OUTPT VISIT, EST, LEVL III, 20-29 MIN: ICD-10-PCS | Mod: 25,S$GLB,, | Performed by: OTOLARYNGOLOGY

## 2022-05-27 PROCEDURE — 1159F PR MEDICATION LIST DOCUMENTED IN MEDICAL RECORD: ICD-10-PCS | Mod: CPTII,S$GLB,, | Performed by: OTOLARYNGOLOGY

## 2022-05-27 NOTE — PROGRESS NOTES
Otology/Neurotology History and Physical     CC: abutment issue    HPI:  Juan C Addison is a 56 y.o. male with hx of CWD AU, BAHA AD presents for follow up. The skin has grown over his abutment and he cannot use his device anymore. He is not having issues with his ears. He sees an ENT in [place] who cleans his cavities. No other complaints today.      Past Medical History  He has a past medical history of HIV (human immunodeficiency virus infection) and Hypertension.    Past Surgical History  He has a past surgical history that includes Tympanoplasty; Tonsillectomy; Hernia repair; Tympanoplasty with mastoidectomy (Right, 2/11/2019); Hernia repair; Tonsillectomy; and Implantation of bone anchored hearing aid (BAHA) (Right, 8/12/2019).    Family History  His family history is not on file.    Social History  He reports that he has never smoked. He has never used smokeless tobacco. He reports current alcohol use. He reports that he does not use drugs.    Allergies  He has No Known Allergies.    Medications  He has a current medication list which includes the following prescription(s): genvoya, lisinopril-hydrochlorothiazide, hydrocodone-acetaminophen, ofloxacin, and tretinoin.    Review of Systems       Objective:     Wt 83.9 kg (185 lb)   BMI 26.54 kg/m²    Physical Exam  Constitutional: Well appearing/communicating. Voice clear. NAD.  Eyes: EOM I Bilaterally  Head/Face: Normocephalic.  House Brackmann I Bilaterally.  Nose: No gross nasal septal deviation. Inferior Turbinates 2+ bilaterally. No septal perforation. No masses/lesions. External nasal skin without masses/lesions.  Oral Cavity: Gingiva/lips WNL. Oral Tongue mobile. Hard Palate WNL.   Oropharynx: Tonsillar fossa/pharyngeal wall without lesions. Posterior oropharynx WNL.  Soft palate without masses. Midline uvula.   Neck/Lymphatic: No LAD I-VI bilaterally.  No thyromegaly.  No masses noted on exam.  Neuro/Psychiatric: AOx3.  Normal mood and affect.    Respiratory: Normal respiratory effort, no stridor/stertor, no retractions noted.    Abutment covered by thin layer of skin. Small amount of purulence expressed from area    Procedure    Microscopy    Left ear: posterosuperior TM perf. Dry. Cerumen cleaned from cavity    Right ear: small epithelial penny at TM. Cerumen cleaned. Dry.      Data Reviewed           Assessment:     1. Status post placement of bone anchored hearing aid (BAHA)         Plan:   Plan for skin debridement of BAHA in the OR

## 2022-05-31 ENCOUNTER — TELEPHONE (OUTPATIENT)
Dept: OTOLARYNGOLOGY | Facility: CLINIC | Age: 57
End: 2022-05-31
Payer: COMMERCIAL

## 2022-05-31 DIAGNOSIS — Z96.21 STATUS POST PLACEMENT OF BONE ANCHORED HEARING AID (BAHA): Primary | ICD-10-CM

## 2022-06-01 ENCOUNTER — TELEPHONE (OUTPATIENT)
Dept: OTOLARYNGOLOGY | Facility: CLINIC | Age: 57
End: 2022-06-01
Payer: COMMERCIAL

## 2022-06-01 NOTE — TELEPHONE ENCOUNTER
Tried to call pt in regards to surgery date. Left message for pt to call back at earliest convenience.

## 2022-06-27 ENCOUNTER — ANESTHESIA EVENT (OUTPATIENT)
Dept: SURGERY | Facility: HOSPITAL | Age: 57
End: 2022-06-27
Payer: COMMERCIAL

## 2022-06-28 ENCOUNTER — TELEPHONE (OUTPATIENT)
Dept: OTOLARYNGOLOGY | Facility: CLINIC | Age: 57
End: 2022-06-28
Payer: COMMERCIAL

## 2022-06-28 PROBLEM — B20 HIV (HUMAN IMMUNODEFICIENCY VIRUS INFECTION): Status: ACTIVE | Noted: 2022-06-28

## 2022-06-28 RX ORDER — OMEGA-3-ACID ETHYL ESTERS 1 G/1
2 CAPSULE, LIQUID FILLED ORAL 2 TIMES DAILY
COMMUNITY
Start: 2022-06-02

## 2022-06-28 RX ORDER — FENOFIBRATE 134 MG/1
134 CAPSULE ORAL DAILY
COMMUNITY
Start: 2022-06-02

## 2022-06-28 NOTE — ANESTHESIA PREPROCEDURE EVALUATION
[facility]  Anesthesia Pre-Operative Evaluation         Patient Name: Juan C Addison  YOB: 1965  MRN: [id number]    SUBJECTIVE:     Pre-operative evaluation for Procedure(s) (LRB):  EXCISION, SKIN, (Right)     06/28/2022    Juan C Addison is a 56 y.o. male w/ a significant PMHx of HLD, HTN (on Lisinopril-HCTZ), HIV, Cholesteatoma.    Patient now presents for the above procedure(s).      LDA: None.       Prev airway: Placement Date: 08/12/19; Placement Time: 0708; Method of Intubation: Direct laryngoscopy; Inserted by: CRNA; Airway Device: Endotracheal Tube; Mask Ventilation: Easy - oral; Intubated: Postinduction; Blade: Li #2; Airway Device Size: 7.5; Style: Cuffed; Cuff Inflation: Minimal occlusive pressure; Placement Verified By: Auscultation, Capnometry, ETT Condensation; Grade: Grade I; Complicating Factors: None; Intubation Findings: Positive EtCO2, Bilateral breath sounds, Atraumatic/Condition of teeth unchanged;  Depth of Insertion: 23; Securment: Lips; Complications: None; Breath Sounds: Equal Bilateral; Insertion Attempts: 1; Removal Date: 08/12/19;  Removal Time: 0823    Drips: None.      Patient Active Problem List   Diagnosis    Cholesteatoma, right    Asymmetrical hearing loss of right ear       Review of patient's allergies indicates:  No Known Allergies    Current Inpatient Medications:      No current facility-administered medications on file prior to encounter.     Current Outpatient Medications on File Prior to Encounter   Medication Sig Dispense Refill    co Q10-red yeast rice  mg Cap red yeast rice      fenofibrate micronized (LOFIBRA) 134 MG Cap Take 134 mg by mouth once daily.      GENVOYA [id number] mg Tab Take 1 tablet by mouth once daily.  1    lisinopril-hydrochlorothiazide (PRINZIDE,ZESTORETIC) 10-12.5 mg per tablet TAKE 1 TABLET BY MOUTH ONCE DAILY FOR 30 DAYS  6    omega-3 acid ethyl esters (LOVAZA) 1 gram capsule Take 2  capsules by mouth 2 (two) times daily.      HYDROcodone-acetaminophen (NORCO) 5-325 mg per tablet Take 1 tablet by mouth every 6 (six) hours as needed. 20 tablet 0    ofloxacin (OCUFLOX) 0.3 % ophthalmic solution Place 4 drops into the right eye 2 (two) times daily. Apply to EAR not eye 10 mL 3    tretinoin (RETIN-A) 0.01 % gel APPLY THIN LAYER TO FACE AT BEDTIME  3       Past Surgical History:   Procedure Laterality Date    HERNIA REPAIR      HERNIA REPAIR      IMPLANTATION OF BONE ANCHORED HEARING AID (BAHA) Right 8/12/2019    Procedure: INSERTION, BAHA;  Surgeon: Slim Capellan MD;  Location: [address];  Service: ENT;  Laterality: Right;    TONSILLECTOMY      TONSILLECTOMY      TYMPANOPLASTY      TYMPANOPLASTY WITH MASTOIDECTOMY Right 2/11/2019    Procedure: TYMPANOPLASTY, WITH MASTOIDECTOMY;  Surgeon: Slim Capellan MD;  Location: [address];  Service: ENT;  Laterality: Right;       Social History     Socioeconomic History    Marital status: Single   Tobacco Use    Smoking status: Never Smoker    Smokeless tobacco: Never Used   Substance and Sexual Activity    Alcohol use: Yes     Comment: 1-2 glasses daily    Drug use: No       OBJECTIVE:     Significant Labs:  No results found for: WBC, HGB, HCT, PLT, CHOL, TRIG, HDL, LDLDIRECT, ALT, AST, NA, K, CL, CREATININE, BUN, CO2, TSH, PSA, INR, GLUF, HGBA1C, MICROALBUR    Diagnostic Studies: No relevant studies.    EKG:   No results found for this or any previous visit.    2D ECHO:  TTE:  No results found for this or any previous visit.    MAGGIE:  No results found for this or any previous visit.    ASSESSMENT/PLAN:           Pre-op Assessment    I have reviewed the Patient Summary Reports.     I have reviewed the Nursing Notes. I have reviewed the NPO Status.   I have reviewed the Medications.     Review of Systems  Anesthesia Hx:  No problems with previous Anesthesia Denies Hx of Anesthetic complications  Neg history of prior surgery. Denies Family  Hx of Anesthesia complications.   Denies Personal Hx of Anesthesia complications.   Social:  Non-Smoker, No Alcohol Use    Cardiovascular:   Hypertension, well controlled Denies CAD.    Denies CABG/stent.   Denies CHF. hyperlipidemia    Pulmonary:   Denies COPD.  Denies Asthma.  Denies Sleep Apnea.    Renal/:   Denies Chronic Renal Disease.     Hepatic/GI:   Denies GERD.    Neurological:   Denies CVA.  Denies Headaches. Denies Seizures.    Endocrine:   Denies Diabetes.    Psych:   Psychiatric History          Physical Exam  General: Well nourished, Cooperative, Alert and Oriented    Airway:  Mallampati: II / II  Mouth Opening: Normal  TM Distance: Normal  Tongue: Normal  Neck ROM: Normal ROM    Dental:  Intact        Anesthesia Plan  Type of Anesthesia, risks & benefits discussed:    Anesthesia Type: MAC, Gen ETT, Gen Supraglottic Airway  Intra-op Monitoring Plan: Standard ASA Monitors  Post Op Pain Control Plan: multimodal analgesia and IV/PO Opioids PRN  Induction:  IV  Airway Plan: Direct and Video, Post-Induction  Informed Consent: Informed consent signed with the Patient and all parties understand the risks and agree with anesthesia plan.  All questions answered. Patient consented to blood products? Yes  ASA Score: 2  Day of Surgery Review of History & Physical: H&P Update referred to the surgeon/provider.    Ready For Surgery From Anesthesia Perspective.     .

## 2022-06-28 NOTE — TELEPHONE ENCOUNTER
Called patient to confirm surgery time and location. Also, to provide pre op instructions. Info provided,patient ok with with the instructions that was provided.

## 2022-06-29 ENCOUNTER — ANESTHESIA (OUTPATIENT)
Dept: SURGERY | Facility: HOSPITAL | Age: 57
End: 2022-06-29
Payer: COMMERCIAL

## 2022-06-29 ENCOUNTER — HOSPITAL ENCOUNTER (OUTPATIENT)
Facility: HOSPITAL | Age: 57
Discharge: HOME | End: 2022-06-29
Attending: OTOLARYNGOLOGY | Admitting: OTOLARYNGOLOGY
Payer: COMMERCIAL

## 2022-06-29 VITALS
HEART RATE: 57 BPM | OXYGEN SATURATION: 96 % | SYSTOLIC BLOOD PRESSURE: 108 MMHG | BODY MASS INDEX: 27.92 KG/M2 | RESPIRATION RATE: 18 BRPM | HEIGHT: 70 IN | DIASTOLIC BLOOD PRESSURE: 69 MMHG | WEIGHT: 195 LBS | TEMPERATURE: 98 F

## 2022-06-29 DIAGNOSIS — Z96.21 STATUS POST PLACEMENT OF BONE ANCHORED HEARING AID (BAHA): Primary | ICD-10-CM

## 2022-06-29 DIAGNOSIS — H91.90 HEARING LOSS: ICD-10-CM

## 2022-06-29 PROCEDURE — 15839 EXC EXCESSIVE SKN OTHER AREA: CPT | Mod: ,,, | Performed by: OTOLARYNGOLOGY

## 2022-06-29 PROCEDURE — 25000003 PHARM REV CODE 250

## 2022-06-29 PROCEDURE — 36000706: Performed by: OTOLARYNGOLOGY

## 2022-06-29 PROCEDURE — 25000003 PHARM REV CODE 250: Performed by: STUDENT IN AN ORGANIZED HEALTH CARE EDUCATION/TRAINING PROGRAM

## 2022-06-29 PROCEDURE — 71000044 HC DOSC ROUTINE RECOVERY FIRST HOUR: Performed by: OTOLARYNGOLOGY

## 2022-06-29 PROCEDURE — 71000015 HC POSTOP RECOV 1ST HR: Performed by: OTOLARYNGOLOGY

## 2022-06-29 PROCEDURE — 37000008 HC ANESTHESIA 1ST 15 MINUTES: Performed by: OTOLARYNGOLOGY

## 2022-06-29 PROCEDURE — 36000707: Performed by: OTOLARYNGOLOGY

## 2022-06-29 PROCEDURE — D9220A PRA ANESTHESIA: Mod: ,,, | Performed by: ANESTHESIOLOGY

## 2022-06-29 PROCEDURE — 63600175 PHARM REV CODE 636 W HCPCS

## 2022-06-29 PROCEDURE — 15839 PR EXCISE EXCESS SKIN TISSUE,OTHER: ICD-10-PCS | Mod: ,,, | Performed by: OTOLARYNGOLOGY

## 2022-06-29 PROCEDURE — 37000009 HC ANESTHESIA EA ADD 15 MINS: Performed by: OTOLARYNGOLOGY

## 2022-06-29 PROCEDURE — 25000003 PHARM REV CODE 250: Performed by: OTOLARYNGOLOGY

## 2022-06-29 PROCEDURE — D9220A PRA ANESTHESIA: ICD-10-PCS | Mod: ,,, | Performed by: ANESTHESIOLOGY

## 2022-06-29 RX ORDER — DEXAMETHASONE SODIUM PHOSPHATE 4 MG/ML
INJECTION, SOLUTION INTRA-ARTICULAR; INTRALESIONAL; INTRAMUSCULAR; INTRAVENOUS; SOFT TISSUE
Status: DISCONTINUED | OUTPATIENT
Start: 2022-06-29 | End: 2022-06-29

## 2022-06-29 RX ORDER — HYDROMORPHONE HYDROCHLORIDE 1 MG/ML
0.2 INJECTION, SOLUTION INTRAMUSCULAR; INTRAVENOUS; SUBCUTANEOUS
Status: DISCONTINUED | OUTPATIENT
Start: 2022-06-29 | End: 2022-06-29 | Stop reason: HOSPADM

## 2022-06-29 RX ORDER — ACETAMINOPHEN 500 MG
1000 TABLET ORAL ONCE
Status: COMPLETED | OUTPATIENT
Start: 2022-06-29 | End: 2022-06-29

## 2022-06-29 RX ORDER — AMOXICILLIN AND CLAVULANATE POTASSIUM 500; 125 MG/1; MG/1
1 TABLET, FILM COATED ORAL 2 TIMES DAILY
Qty: 14 TABLET | Refills: 0 | Status: SHIPPED | OUTPATIENT
Start: 2022-06-29 | End: 2022-07-06

## 2022-06-29 RX ORDER — ONDANSETRON 4 MG/1
8 TABLET, ORALLY DISINTEGRATING ORAL EVERY 6 HOURS PRN
Qty: 10 TABLET | Refills: 0 | Status: SHIPPED | OUTPATIENT
Start: 2022-06-29

## 2022-06-29 RX ORDER — LIDOCAINE HYDROCHLORIDE 20 MG/ML
INJECTION, SOLUTION EPIDURAL; INFILTRATION; INTRACAUDAL; PERINEURAL
Status: DISCONTINUED | OUTPATIENT
Start: 2022-06-29 | End: 2022-06-29

## 2022-06-29 RX ORDER — SODIUM CHLORIDE 0.9 % (FLUSH) 0.9 %
10 SYRINGE (ML) INJECTION
Status: DISCONTINUED | OUTPATIENT
Start: 2022-06-29 | End: 2022-06-29 | Stop reason: HOSPADM

## 2022-06-29 RX ORDER — ONDANSETRON 2 MG/ML
INJECTION INTRAMUSCULAR; INTRAVENOUS
Status: DISCONTINUED | OUTPATIENT
Start: 2022-06-29 | End: 2022-06-29

## 2022-06-29 RX ORDER — PROPOFOL 10 MG/ML
VIAL (ML) INTRAVENOUS
Status: DISCONTINUED | OUTPATIENT
Start: 2022-06-29 | End: 2022-06-29

## 2022-06-29 RX ORDER — HALOPERIDOL 5 MG/ML
0.5 INJECTION INTRAMUSCULAR EVERY 10 MIN PRN
Status: DISCONTINUED | OUTPATIENT
Start: 2022-06-29 | End: 2022-06-29 | Stop reason: HOSPADM

## 2022-06-29 RX ORDER — MIDAZOLAM HYDROCHLORIDE 1 MG/ML
INJECTION, SOLUTION INTRAMUSCULAR; INTRAVENOUS
Status: DISCONTINUED | OUTPATIENT
Start: 2022-06-29 | End: 2022-06-29

## 2022-06-29 RX ORDER — HYDROCODONE BITARTRATE AND ACETAMINOPHEN 5; 325 MG/1; MG/1
1 TABLET ORAL EVERY 6 HOURS PRN
Qty: 8 TABLET | Refills: 0 | Status: SHIPPED | OUTPATIENT
Start: 2022-06-29

## 2022-06-29 RX ORDER — SODIUM CHLORIDE 9 MG/ML
INJECTION, SOLUTION INTRAVENOUS CONTINUOUS
Status: DISCONTINUED | OUTPATIENT
Start: 2022-06-29 | End: 2022-06-29 | Stop reason: HOSPADM

## 2022-06-29 RX ORDER — LIDOCAINE HYDROCHLORIDE 10 MG/ML
1 INJECTION, SOLUTION EPIDURAL; INFILTRATION; INTRACAUDAL; PERINEURAL ONCE AS NEEDED
Status: COMPLETED | OUTPATIENT
Start: 2022-06-29 | End: 2022-06-29

## 2022-06-29 RX ORDER — FENTANYL CITRATE 50 UG/ML
INJECTION, SOLUTION INTRAMUSCULAR; INTRAVENOUS
Status: DISCONTINUED | OUTPATIENT
Start: 2022-06-29 | End: 2022-06-29

## 2022-06-29 RX ORDER — LIDOCAINE HYDROCHLORIDE AND EPINEPHRINE 10; 10 MG/ML; UG/ML
INJECTION, SOLUTION INFILTRATION; PERINEURAL
Status: DISCONTINUED | OUTPATIENT
Start: 2022-06-29 | End: 2022-06-29 | Stop reason: HOSPADM

## 2022-06-29 RX ORDER — DEXMEDETOMIDINE HYDROCHLORIDE 100 UG/ML
INJECTION, SOLUTION INTRAVENOUS
Status: DISCONTINUED | OUTPATIENT
Start: 2022-06-29 | End: 2022-06-29

## 2022-06-29 RX ADMIN — SODIUM CHLORIDE: 0.9 INJECTION, SOLUTION INTRAVENOUS at 07:06

## 2022-06-29 RX ADMIN — PROPOFOL 50 MG: 10 INJECTION, EMULSION INTRAVENOUS at 08:06

## 2022-06-29 RX ADMIN — ONDANSETRON 4 MG: 2 INJECTION INTRAMUSCULAR; INTRAVENOUS at 08:06

## 2022-06-29 RX ADMIN — Medication 2 G: at 08:06

## 2022-06-29 RX ADMIN — DEXAMETHASONE SODIUM PHOSPHATE 4 MG: 4 INJECTION INTRA-ARTICULAR; INTRALESIONAL; INTRAMUSCULAR; INTRAVENOUS; SOFT TISSUE at 08:06

## 2022-06-29 RX ADMIN — GLYCOPYRROLATE 0.2 MG: 0.2 INJECTION INTRAMUSCULAR; INTRAVENOUS at 08:06

## 2022-06-29 RX ADMIN — LIDOCAINE HYDROCHLORIDE 10 MG: 10 INJECTION, SOLUTION EPIDURAL; INFILTRATION; INTRACAUDAL at 06:06

## 2022-06-29 RX ADMIN — MIDAZOLAM 2 MG: 1 INJECTION INTRAMUSCULAR; INTRAVENOUS at 08:06

## 2022-06-29 RX ADMIN — SODIUM CHLORIDE: 9 INJECTION, SOLUTION INTRAVENOUS at 07:06

## 2022-06-29 RX ADMIN — DEXMEDETOMIDINE HYDROCHLORIDE 12 MCG: 100 INJECTION, SOLUTION INTRAVENOUS at 08:06

## 2022-06-29 RX ADMIN — PROPOFOL 200 MG: 10 INJECTION, EMULSION INTRAVENOUS at 08:06

## 2022-06-29 RX ADMIN — LIDOCAINE HYDROCHLORIDE 100 MG: 20 INJECTION, SOLUTION EPIDURAL; INFILTRATION; INTRACAUDAL at 08:06

## 2022-06-29 RX ADMIN — ACETAMINOPHEN 1000 MG: 500 TABLET ORAL at 06:06

## 2022-06-29 RX ADMIN — FENTANYL CITRATE 50 MCG: 50 INJECTION INTRAMUSCULAR; INTRAVENOUS at 08:06

## 2022-06-29 RX ADMIN — SODIUM CHLORIDE, SODIUM LACTATE, POTASSIUM CHLORIDE, AND CALCIUM CHLORIDE: .6; .31; .03; .02 INJECTION, SOLUTION INTRAVENOUS at 08:06

## 2022-06-29 NOTE — TRANSFER OF CARE
"Anesthesia Transfer of Care Note    Patient: Juan C Addison    Procedure(s) Performed: Procedure(s) (LRB):  BENIGN EXCISION, SKIN, (Right)    Patient location: PACU    Anesthesia Type: general    Transport from OR: Transported from OR on 6-10 L/min O2 by face mask with adequate spontaneous ventilation    Post pain: adequate analgesia    Post assessment: no apparent anesthetic complications    Post vital signs: stable    Level of consciousness: awake and alert    Nausea/Vomiting: no nausea/vomiting    Complications: none    Transfer of care protocol was followed      Last vitals:   Visit Vitals  BP (!) 98/56 (BP Location: Right arm, Patient Position: Lying)   Pulse 67   Temp 36.4 °C (97.6 °F) (Temporal)   Resp 18   Ht 5' 10" (1.778 m)   Wt 88.5 kg (195 lb)   SpO2 99%   BMI 27.98 kg/m²     "

## 2022-06-29 NOTE — OP NOTE
[facility] - Surgery ([facility])  Surgery Department  Operative Note    SUMMARY     Date of Procedure: 6/29/2022     Procedure: Procedure(s) (LRB):  BENIGN EXCISION, SKIN, (Right) 1.5cm    Surgeon(s) and Role:     * Slim Capellan MD - Primary     * Johanna Alfonso MD - Resident - Assisting        Pre-Operative Diagnosis: Status post placement of bone anchored hearing aid (BAHA) [Z96.21]    Post-Operative Diagnosis: Post-Op Diagnosis Codes:     * Status post placement of bone anchored hearing aid (BAHA) [Z96.21]    Anesthesia: General/MAC    Operative Findings (including complications, if any): overgrowth of skin of patient's previous bone conducting device.  This was excised and abutment was re-exposed.    Description of Technical Procedures:       Patient was brought to the operating room and intubated by the anesthesia team.  The bed was turned 180 degrees the selected side was prepped by shaving the post auricular area around the site.    The area surrounding the abutment was injected with local anesthesia.  A 15 blade was used to excise 1.5cm circular elliptical area of skin covering the abutment.  This was discarded. The skin edges were cauterized with bipolar cautery. The abutment was brought through the excised area by pressing down on the surround skin.  Then a healing cap was applied to the abutment.  Xeroform gauze was placed in a circular pattern around the abutment to keep the skin at lowered level. The procedure was terminated      Estimated Blood Loss (EBL): 2ml    Implants: * No implants in log *    Specimens:   Specimen (24h ago, onward)            None                  Condition: Good    Disposition: PACU - hemodynamically stable.    Attestation: I was present and scrubbed for the entire procedure.

## 2022-06-29 NOTE — ANESTHESIA POSTPROCEDURE EVALUATION
Anesthesia Post Evaluation    Patient: Juan C Addison    Procedure(s) Performed: Procedure(s) (LRB):  BENIGN EXCISION, SKIN, (Right)    Final Anesthesia Type: general      Patient location during evaluation: PACU  Patient participation: Yes- Able to Participate  Level of consciousness: awake and alert  Post-procedure vital signs: reviewed and stable  Pain management: adequate  Airway patency: patent    PONV status at discharge: No PONV  Anesthetic complications: no      Cardiovascular status: blood pressure returned to baseline  Respiratory status: unassisted, spontaneous ventilation and room air  Hydration status: euvolemic  Follow-up not needed.          Vitals Value Taken Time   /69 06/29/22 1002   Temp 36.4 °C (97.6 °F) 06/29/22 0900   Pulse 57 06/29/22 1009   Resp 18 06/29/22 1009   SpO2 96 % 06/29/22 1009   Vitals shown include unvalidated device data.      No case tracking events are documented in the log.      Pain/Chelsey Score: Pain Rating Prior to Med Admin: 0 (6/29/2022  6:54 AM)  Chelsey Score: 9 (6/29/2022  9:37 AM)

## 2022-06-29 NOTE — PLAN OF CARE
Patient states ready for discharge. Pain assessed and acceptable at this time. No complaints of nausea. Discharge instructions reviewed.

## 2022-06-29 NOTE — ANESTHESIA PROCEDURE NOTES
LMA    Date/Time: 6/29/2022 8:14 AM  Performed by: Meet Hernandez MD  Authorized by: Flaca Romero MD     Intubation:     Induction:  Intravenous    Intubated:  Postinduction    Mask Ventilation:  Easy mask    Attempts:  1    Attempted By:  Resident anesthesiologist    Difficult Airway Encountered?: No      Complications:  None    Airway Device:  Supraglottic airway/LMA    Airway Device Size:  4.5    Secured at:  The lips    Placement Verified By:  Capnometry    Complicating Factors:  None    Findings Post-Intubation:  BS equal bilateral and atraumatic/condition of teeth unchanged

## 2022-06-29 NOTE — PATIENT INSTRUCTIONS
DISCHARGE INSTRUCTIONS:    Discharge Procedure Orders   Diet Adult Regular     Notify your health care provider if you experience any of the following:  temperature >100.4     Notify your health care provider if you experience any of the following:  severe uncontrolled pain     Notify your health care provider if you experience any of the following:  redness, tenderness, or signs of infection (pain, swelling, redness, odor or green/yellow discharge around incision site)     Leave dressing on - Keep it clean, dry, and intact until clinic visit   Order Comments: Keep cover over BAHA insertion site until follow up  If falls off, just click it back in  Keep wound clean and dry, avoid getting wet     Activity as tolerated

## 2022-06-29 NOTE — PLAN OF CARE
Chart reviewed. Pre-op nursing care and surgery checklist complete. Family not present at bedside. Patient belongings placed in locker.

## 2022-06-29 NOTE — H&P
"Otology/Neurotology History and Physical     CC: abutment issue    HPI:  Juan C Addison is a 56 y.o. male with hx of CWD AU, BAHA AD presents for follow up. The skin has grown over his abutment and he cannot use his device anymore. He is not having issues with his ears. He sees an ENT in [place] who cleans his cavities. No other complaints today.      Past Medical History  He has a past medical history of HIV (human immunodeficiency virus infection) and Hypertension.    Past Surgical History  He has a past surgical history that includes Tympanoplasty; Tonsillectomy; Hernia repair; Tympanoplasty with mastoidectomy (Right, 2/11/2019); Hernia repair; Tonsillectomy; and Implantation of bone anchored hearing aid (BAHA) (Right, 8/12/2019).    Family History  His family history is not on file.    Social History  He reports that he has never smoked. He has never used smokeless tobacco. He reports current alcohol use. He reports that he does not use drugs.    Allergies  He has No Known Allergies.    Medications  He has a current medication list which includes the following prescription(s): genvoya, lisinopril-hydrochlorothiazide, hydrocodone-acetaminophen, ofloxacin, and tretinoin.    Review of Systems       Objective:     /88 (BP Location: Left arm, Patient Position: Lying)   Pulse 67   Temp 97.7 °F (36.5 °C)   Resp 20   Ht 5' 10" (1.778 m)   Wt 88.5 kg (195 lb)   SpO2 96%   BMI 27.98 kg/m²    Physical Exam  Constitutional: Well appearing/communicating. Voice clear. NAD.  Eyes: EOM I Bilaterally  Head/Face: Normocephalic.  House Brackmann I Bilaterally.  Nose: No gross nasal septal deviation. Inferior Turbinates 2+ bilaterally. No septal perforation. No masses/lesions. External nasal skin without masses/lesions.  Oral Cavity: Gingiva/lips WNL. Oral Tongue mobile. Hard Palate WNL.   Oropharynx: Tonsillar fossa/pharyngeal wall without lesions. Posterior oropharynx WNL.  Soft palate without masses. Midline " uvula.   Neck/Lymphatic: No LAD I-VI bilaterally.  No thyromegaly.  No masses noted on exam.  Neuro/Psychiatric: AOx3.  Normal mood and affect.   Respiratory: Normal respiratory effort, no stridor/stertor, no retractions noted.    Abutment covered by thin layer of skin. Small amount of purulence expressed from area    Procedure    Microscopy    Left ear: posterosuperior TM perf. Dry. Cerumen cleaned from cavity    Right ear: small epithelial penny at TM. Cerumen cleaned. Dry.      Data Reviewed           Assessment:     No diagnosis found.     Plan:   Plan for skin debridement of BAHA in the OR

## 2022-06-29 NOTE — BRIEF OP NOTE
[facility]ell[facility] - Surgery ([facility])  Brief Operative Note    Surgery Date: 6/29/2022     Surgeon(s) and Role:     * Slim Capellan MD - Primary     * Johanna Alfonso MD - Resident - Assisting        Pre-op Diagnosis:  Status post placement of bone anchored hearing aid (BAHA) [Z96.21]    Post-op Diagnosis:  Post-Op Diagnosis Codes:     * Status post placement of bone anchored hearing aid (BAHA) [Z96.21]    Procedure(s) (LRB):  BENIGN EXCISION, SKIN, (Right)    Anesthesia: General/MAC    Operative Findings: See op note     Estimated Blood Loss:  1cc         Specimens:   Specimen (24h ago, onward)            None            Discharge Note    OUTCOME: Patient tolerated treatment/procedure well without complication and is now ready for discharge.    DISPOSITION: Home or Self Care    FINAL DIAGNOSIS:  Status post placement of bone anchored hearing aid (BAHA)    FOLLOWUP: In clinic    DISCHARGE INSTRUCTIONS:    Discharge Procedure Orders   Diet Adult Regular     Notify your health care provider if you experience any of the following:  temperature >100.4     Notify your health care provider if you experience any of the following:  severe uncontrolled pain     Notify your health care provider if you experience any of the following:  redness, tenderness, or signs of infection (pain, swelling, redness, odor or green/yellow discharge around incision site)     Leave dressing on - Keep it clean, dry, and intact until clinic visit   Order Comments: Keep cover over BAHA insertion site until follow up  If falls off, just click it back in  Keep wound clean and dry, avoid getting wet     Activity as tolerated

## 2023-02-10 ENCOUNTER — OFFICE VISIT (OUTPATIENT)
Dept: OTOLARYNGOLOGY | Facility: CLINIC | Age: 58
End: 2023-02-10
Payer: COMMERCIAL

## 2023-02-10 ENCOUNTER — CLINICAL SUPPORT (OUTPATIENT)
Dept: AUDIOLOGY | Facility: CLINIC | Age: 58
End: 2023-02-10
Payer: COMMERCIAL

## 2023-02-10 DIAGNOSIS — H90.6 MIXED HEARING LOSS, BILATERAL: ICD-10-CM

## 2023-02-10 DIAGNOSIS — Z96.21 STATUS POST PLACEMENT OF BONE ANCHORED HEARING AID (BAHA): Primary | ICD-10-CM

## 2023-02-10 DIAGNOSIS — H90.A31 MIXED CONDUCTIVE AND SENSORINEURAL HEARING LOSS OF RIGHT EAR WITH RESTRICTED HEARING OF LEFT EAR: Primary | ICD-10-CM

## 2023-02-10 DIAGNOSIS — H91.8X1 OTHER SPECIFIED HEARING LOSS, RIGHT EAR: ICD-10-CM

## 2023-02-10 PROCEDURE — 99999 PR PBB SHADOW E&M-EST. PATIENT-LVL I: ICD-10-PCS | Mod: PBBFAC,,, | Performed by: AUDIOLOGIST

## 2023-02-10 PROCEDURE — 99999 PR PBB SHADOW E&M-EST. PATIENT-LVL I: CPT | Mod: PBBFAC,,, | Performed by: AUDIOLOGIST

## 2023-02-10 PROCEDURE — 99213 OFFICE O/P EST LOW 20 MIN: CPT | Mod: 25,S$GLB,, | Performed by: OTOLARYNGOLOGY

## 2023-02-10 PROCEDURE — 1159F MED LIST DOCD IN RCRD: CPT | Mod: CPTII,S$GLB,, | Performed by: OTOLARYNGOLOGY

## 2023-02-10 PROCEDURE — 4010F ACE/ARB THERAPY RXD/TAKEN: CPT | Mod: CPTII,S$GLB,, | Performed by: OTOLARYNGOLOGY

## 2023-02-10 PROCEDURE — 69220 CLEAN OUT MASTOID CAVITY: CPT | Mod: 50,S$GLB,, | Performed by: OTOLARYNGOLOGY

## 2023-02-10 PROCEDURE — 4010F PR ACE/ARB THEARPY RXD/TAKEN: ICD-10-PCS | Mod: CPTII,S$GLB,, | Performed by: OTOLARYNGOLOGY

## 2023-02-10 PROCEDURE — 99213 PR OFFICE/OUTPT VISIT, EST, LEVL III, 20-29 MIN: ICD-10-PCS | Mod: 25,S$GLB,, | Performed by: OTOLARYNGOLOGY

## 2023-02-10 PROCEDURE — 92557 COMPREHENSIVE HEARING TEST: CPT | Mod: S$GLB,,, | Performed by: AUDIOLOGIST

## 2023-02-10 PROCEDURE — 92567 TYMPANOMETRY: CPT | Mod: S$GLB,,, | Performed by: AUDIOLOGIST

## 2023-02-10 PROCEDURE — 99999 PR PBB SHADOW E&M-EST. PATIENT-LVL III: CPT | Mod: PBBFAC,,, | Performed by: OTOLARYNGOLOGY

## 2023-02-10 PROCEDURE — 99999 PR PBB SHADOW E&M-EST. PATIENT-LVL III: ICD-10-PCS | Mod: PBBFAC,,, | Performed by: OTOLARYNGOLOGY

## 2023-02-10 PROCEDURE — 1159F PR MEDICATION LIST DOCUMENTED IN MEDICAL RECORD: ICD-10-PCS | Mod: CPTII,S$GLB,, | Performed by: OTOLARYNGOLOGY

## 2023-02-10 PROCEDURE — 92557 PR COMPREHENSIVE HEARING TEST: ICD-10-PCS | Mod: S$GLB,,, | Performed by: AUDIOLOGIST

## 2023-02-10 PROCEDURE — 92567 PR TYMPA2METRY: ICD-10-PCS | Mod: S$GLB,,, | Performed by: AUDIOLOGIST

## 2023-02-10 PROCEDURE — 69220 PR DEBRIDE, MASTOID CAVITY, SIMPLE: ICD-10-PCS | Mod: 50,S$GLB,, | Performed by: OTOLARYNGOLOGY

## 2023-02-10 NOTE — PROGRESS NOTES
Audiologic Evaluation 2/10/2023:       Juan C Addison, a 57 y.o. male, was seen today in the clinic for an audiologic evaluation. Mr. Addison has a history of bilateral cholesteatoma and currently wears a BAHA behind his right ear. Mr. Addison reported occasional tinnitus. He denied otalgia.      Tympanometry revealed Type B tympanogram with large ear canal volume in the right ear and could not be obtained in the left ear. Audiogram results revealed mild sloping to profound mixed hearing loss in the right ear and slight sloping to severe mixed hearing loss in the left ear.  Speech reception thresholds were noted at 75 dB in the right ear and 40 dB in the left ear.  Speech discrimination scores were 80% in the right ear and 88% in the left ear.    Recommendations:  Otologic evaluation  Follow-up with BAHA audiologist annually  Annual audiogram  Hearing protection when in noise

## 2023-02-10 NOTE — PROGRESS NOTES
Otology/Neurotology History and Physical     CC: follow up     HPI:  Juan C Addison is a 57 y.o. male with hx of CWD AU, BAHA AD presents for follow up. He reports is doing well. Using his BAHA daily.  Denies any otorrhea.        Review of Systems   Constitutional: Negative.    HENT: Negative.     Eyes: Negative.    Respiratory: Negative.     Cardiovascular: Negative.    Gastrointestinal: Negative.    Endocrine: Negative.    Genitourinary: Negative.    Musculoskeletal: Negative.    Skin: Negative.    Allergic/Immunologic: Negative.    Neurological: Negative.    Hematological: Negative.    Psychiatric/Behavioral: Negative.          Objective:     There were no vitals taken for this visit.   Physical Exam  Constitutional: Well appearing/communicating. Voice clear. NAD.  Eyes: EOM I Bilaterally  Head/Face: Normocephalic.  House Brackmann I Bilaterally.        Procedure:  Patient was brought to the minor procedure room and first the right then the left ear cavity was cleaned with microdissection techniques. There was no evidence of residual or recurrent cholesteatoma. Patient tolerated the procedure well.        Data Reviewed        Audiogram tracings independently reviewed and discussed with patient  shows bilateral AD > AS mixed hearing loss      Assessment:     1. Status post placement of bone anchored hearing aid (BAHA)    2. Asymmetrical hearing loss of right ear    3. Mixed hearing loss, bilateral         Plan:     Cavities cleaned AU  Audio shows slight worsening of left ear since priors  F/u as needed   Continue BAHA

## 2024-01-03 ENCOUNTER — PATIENT MESSAGE (OUTPATIENT)
Dept: OTOLARYNGOLOGY | Facility: CLINIC | Age: 59
End: 2024-01-03
Payer: COMMERCIAL

## 2024-03-06 ENCOUNTER — PATIENT MESSAGE (OUTPATIENT)
Dept: OTOLARYNGOLOGY | Facility: CLINIC | Age: 59
End: 2024-03-06
Payer: COMMERCIAL

## 2024-03-08 ENCOUNTER — CLINICAL SUPPORT (OUTPATIENT)
Dept: AUDIOLOGY | Facility: CLINIC | Age: 59
End: 2024-03-08
Payer: COMMERCIAL

## 2024-03-08 ENCOUNTER — TELEPHONE (OUTPATIENT)
Dept: AUDIOLOGY | Facility: CLINIC | Age: 59
End: 2024-03-08

## 2024-03-08 DIAGNOSIS — H90.A31 MIXED CONDUCTIVE AND SENSORINEURAL HEARING LOSS OF RIGHT EAR WITH RESTRICTED HEARING OF LEFT EAR: Primary | ICD-10-CM

## 2024-03-08 PROCEDURE — 99499 UNLISTED E&M SERVICE: CPT | Mod: S$GLB,,,

## 2024-03-08 PROCEDURE — 99999 PR PBB SHADOW E&M-EST. PATIENT-LVL I: CPT | Mod: PBBFAC,,,

## 2024-03-08 PROCEDURE — 92557 COMPREHENSIVE HEARING TEST: CPT | Mod: S$GLB,,,

## 2024-03-08 NOTE — PROGRESS NOTES
Juan C Addison was seen today in the clinic for an audiologic evaluation.  Patient's main complaint was hearing loss.  Mr. Addison wears a right sided Baha 5 connect system, and he has noticed that the sound quality has been less clear. Mr. Addison mentioned that one of his processors does not work at all due to a possible battery issue, and the one that he does use is intermittent. Mr. Addison is interested in upgrading to the Baha 6. He also mentioned that his left ear has been draining.     Tympanometry could not be obtained due to inability to obtain a hermetic seal in both ears.     Audiogram results revealed a mild sloping to profound mixed hearing loss in the right ear and a slight sloping to severe mixed hearing loss in the left ear.      Speech reception thresholds were noted at 45 dB in the right ear and 30 dB in the left ear.    Speech discrimination scores were 80% in the right ear and 100% in the left ear.    Recommendations:  Otologic evaluation  Contact Cochlear for Baha upgrade  Annual audiogram, or sooner if any changes in hearing are noted  Hearing protection when in noise

## 2024-03-08 NOTE — PROGRESS NOTES
"Bone Anchored Hearing Aids    Mr. Juan C Addison was seen today to discuss a Baha upgrade. He currently wears a Baha 5 connect system on his right side. Mr. Addison mentioned that his backup processor does not work at all due to a possible battery issue. The other processor that he wears "rattles" and can be intermittent. He also mentioned that the sound quality has deteriorated. He is very interested in upgrading to the Baha 6, and Mr. Addison was given the number to Cochlear to start the process.     An updated audiogram was completed today, and Mr. Addison did not want any adjustments made to his current Baha. He did mention that the Baha is no longer able to connect to his phone, and it was re-programmed to his phone and also connected to the kaden.     Recommendations:  1) Call Cochlear to start the upgrade process  2) Return to the clinic for programming of new Baha device    "

## 2024-04-22 ENCOUNTER — TELEPHONE (OUTPATIENT)
Dept: AUDIOLOGY | Facility: CLINIC | Age: 59
End: 2024-04-22
Payer: COMMERCIAL

## 2024-06-07 ENCOUNTER — CLINICAL SUPPORT (OUTPATIENT)
Dept: AUDIOLOGY | Facility: CLINIC | Age: 59
End: 2024-06-07
Payer: COMMERCIAL

## 2024-06-07 DIAGNOSIS — H90.A31 MIXED CONDUCTIVE AND SENSORINEURAL HEARING LOSS OF RIGHT EAR WITH RESTRICTED HEARING OF LEFT EAR: Primary | ICD-10-CM

## 2024-06-07 PROCEDURE — 92622 DX ALY AUD OI SND PRCSR 1ST: CPT | Mod: S$GLB,,,

## 2024-06-07 NOTE — PROGRESS NOTES
Mr. Juan C Addison was seen today for a Cochlear Baha Connect fitting with an upgraded set of Baha 6 Max. Mr. Addison external processor was connected to the programming software and the [employer] Direct and feedback testing was completed. Back-up processor was also programmed. Proper care and maintenance was discussed. He successfully practiced attaching the device. Processor was paired to his iPhone as well as the Baha kaden.     Bone Conduction Hearing Aid:  : Cochlear  Model: Baha 6 Max Connect  Side: Right  Battery: 312  RT SN: [id number]    Recommendations:  1) Daily use of Baha 6 Max  2) Follow up in one month

## 2024-11-03 NOTE — TRANSFER OF CARE
"Anesthesia Transfer of Care Note    Patient: Juan C Addison    Procedure(s) Performed: Procedure(s) (LRB):  INSERTION, BAHA (Right)    Patient location: PACU    Anesthesia Type: general    Transport from OR: Transported from OR on 6-10 L/min O2 by face mask with adequate spontaneous ventilation    Post pain: adequate analgesia    Post assessment: no apparent anesthetic complications and tolerated procedure well    Post vital signs: stable    Level of consciousness: awake, alert and oriented    Nausea/Vomiting: no nausea/vomiting    Complications: none    Transfer of care protocol was followed      Last vitals:   Visit Vitals  /73   Pulse 85   Temp 36.3 °C (97.3 °F) (Temporal)   Resp 18   Ht 5' 10" (1.778 m)   Wt 85.7 kg (189 lb)   SpO2 98%   BMI 27.12 kg/m²     " PAST MEDICAL HISTORY:  Migraines     Vertigo

## (undated) DEVICE — CLOSURE SKIN STERI STRIP 1/2X4

## (undated) DEVICE — STAPLER SKIN PROXIMATE WIDE

## (undated) DEVICE — SUT CTD VICRYL 3-0 PS-2 UND

## (undated) DEVICE — BAND RUBBER ROYLAN YELLOW

## (undated) DEVICE — Device

## (undated) DEVICE — BURR FINE ROUND DIAMOND 3MM

## (undated) DEVICE — KIT DRESS GLASSCK EAR ADLT ST

## (undated) DEVICE — SPONGE SURGIFOAM 100 8.5X12X10

## (undated) DEVICE — SEE MEDLINE ITEM 154981

## (undated) DEVICE — ELECTRODE REM PLYHSV RETURN 9

## (undated) DEVICE — SUCTION SURGICAL FRAZR

## (undated) DEVICE — KIT EAR EAOFE

## (undated) DEVICE — BIT DRILL TUBING

## (undated) DEVICE — FORCEP BIPOLAR ADSON 1MM TIP

## (undated) DEVICE — BURR DIAMOND SM. ROUND

## (undated) DEVICE — SUT VICRYL 3-0 8-18 CP-2

## (undated) DEVICE — KIT SUCTION IRRIGATION

## (undated) DEVICE — CAP HEALING W/ PLUG 30MM

## (undated) DEVICE — SEE MEDLINE ITEM 152622

## (undated) DEVICE — BLADE SURG CARBON STEEL SZ11

## (undated) DEVICE — NDL HYPO REG 25G X 1 1/2

## (undated) DEVICE — DRESSING GLASSCOCK PED MASTOID

## (undated) DEVICE — GUIDE DRILL CONICAL TIP F/3

## (undated) DEVICE — DRESSING XEROFORM FOIL PK 1X8

## (undated) DEVICE — BLADE SCALP BEAVER 2.5MM ANG

## (undated) DEVICE — DRAPE SURGICAL STERI IRRG PCH

## (undated) DEVICE — SEE MEDLINE ITEM 157128

## (undated) DEVICE — PUNCH BIOPSY 6MM DERMAL DISP

## (undated) DEVICE — SOL IRR NACL .9% 3000ML

## (undated) DEVICE — CLIPPER BLADE MOD 4406 (CAREF)

## (undated) DEVICE — DRAPE STERI 32 X 50

## (undated) DEVICE — BIT DRILL COUNTERSINK 4MM

## (undated) DEVICE — PUNCH BIOPSY 4MM STERILE DISPO

## (undated) DEVICE — SUT BONE WAX 2.5 GRMS 12/BX

## (undated) DEVICE — CAP HEALING BAHA W/PLUG

## (undated) DEVICE — BURR LSO ROUND FLUTED 5MM

## (undated) DEVICE — PAD CUSTOM COTTON 2 X 2

## (undated) DEVICE — SEE MEDLINE ITEM 147518

## (undated) DEVICE — SYR SLIP TIP 1CC

## (undated) DEVICE — BLADE SCALP OPTHL NDL TIP 3MM

## (undated) DEVICE — BUR E9000 4X48MM

## (undated) DEVICE — SUCTION SURGICAL STR 7FR

## (undated) DEVICE — BLADE OTOLOGY BEAVER 5X84MM

## (undated) DEVICE — SUT ETHILON 3-0 PS2 18 BLK

## (undated) DEVICE — CORD BIPOLAR 12 FOOT

## (undated) DEVICE — ELECTRODE NEEDLE 2.8IN

## (undated) DEVICE — SOL IRR WATER STRL 3000 ML

## (undated) DEVICE — MARKER SKIN STND TIP BLUE BARR

## (undated) DEVICE — BURR DIAMOND 4 MM ROUND

## (undated) DEVICE — BLADE SURG #15 CARBON STEEL

## (undated) DEVICE — SEE MEDLINE ITEM 146373

## (undated) DEVICE — TOWEL OR DISP STRL BLUE 4/PK

## (undated) DEVICE — BLADE SCALP OPHTL RND TIP

## (undated) DEVICE — SUT 3/0 18IN COATED VICRYLP